# Patient Record
Sex: FEMALE | Race: BLACK OR AFRICAN AMERICAN | NOT HISPANIC OR LATINO | Employment: UNEMPLOYED | URBAN - METROPOLITAN AREA
[De-identification: names, ages, dates, MRNs, and addresses within clinical notes are randomized per-mention and may not be internally consistent; named-entity substitution may affect disease eponyms.]

---

## 2017-06-10 LAB
CHOLEST SERPL-MSCNC: 229 MG/DL (ref 100–200)
HDLC SERPL-MCNC: 75 MG/DL (ref 65–100)
LDL/HDL RATIO: 1.8 (ref 1–3)
LDLC SERPL CALC-MCNC: 132 MG/DL (ref 0–100)
TRIGL SERPL-MCNC: 110 MG/DL (ref 0–150)

## 2019-04-01 RX ORDER — LISINOPRIL 40 MG/1
TABLET ORAL
Qty: 90 TABLET | Refills: 3 | Status: SHIPPED | OUTPATIENT
Start: 2019-04-01 | End: 2019-06-06 | Stop reason: SDUPTHER

## 2019-06-06 ENCOUNTER — OFFICE VISIT (OUTPATIENT)
Dept: FAMILY MEDICINE | Facility: CLINIC | Age: 84
End: 2019-06-06
Payer: MEDICARE

## 2019-06-06 VITALS
SYSTOLIC BLOOD PRESSURE: 140 MMHG | HEIGHT: 64 IN | DIASTOLIC BLOOD PRESSURE: 80 MMHG | BODY MASS INDEX: 22.88 KG/M2 | RESPIRATION RATE: 16 BRPM | WEIGHT: 134 LBS | OXYGEN SATURATION: 98 % | TEMPERATURE: 98 F | HEART RATE: 74 BPM

## 2019-06-06 DIAGNOSIS — E78.5 HYPERLIPIDEMIA, UNSPECIFIED HYPERLIPIDEMIA TYPE: ICD-10-CM

## 2019-06-06 DIAGNOSIS — D50.8 IRON DEFICIENCY ANEMIA SECONDARY TO INADEQUATE DIETARY IRON INTAKE: ICD-10-CM

## 2019-06-06 DIAGNOSIS — K21.9 GASTROESOPHAGEAL REFLUX DISEASE WITHOUT ESOPHAGITIS: ICD-10-CM

## 2019-06-06 DIAGNOSIS — I10 ESSENTIAL HYPERTENSION: Primary | ICD-10-CM

## 2019-06-06 DIAGNOSIS — I73.9 PAD (PERIPHERAL ARTERY DISEASE): ICD-10-CM

## 2019-06-06 PROCEDURE — 99213 OFFICE O/P EST LOW 20 MIN: CPT | Mod: S$GLB,,, | Performed by: FAMILY MEDICINE

## 2019-06-06 PROCEDURE — 3077F PR MOST RECENT SYSTOLIC BLOOD PRESSURE >= 140 MM HG: ICD-10-PCS | Mod: CPTII,S$GLB,, | Performed by: FAMILY MEDICINE

## 2019-06-06 PROCEDURE — 3079F PR MOST RECENT DIASTOLIC BLOOD PRESSURE 80-89 MM HG: ICD-10-PCS | Mod: CPTII,S$GLB,, | Performed by: FAMILY MEDICINE

## 2019-06-06 PROCEDURE — 3079F DIAST BP 80-89 MM HG: CPT | Mod: CPTII,S$GLB,, | Performed by: FAMILY MEDICINE

## 2019-06-06 PROCEDURE — 3077F SYST BP >= 140 MM HG: CPT | Mod: CPTII,S$GLB,, | Performed by: FAMILY MEDICINE

## 2019-06-06 PROCEDURE — 99213 PR OFFICE/OUTPT VISIT, EST, LEVL III, 20-29 MIN: ICD-10-PCS | Mod: S$GLB,,, | Performed by: FAMILY MEDICINE

## 2019-06-06 RX ORDER — AMLODIPINE BESYLATE 5 MG/1
TABLET ORAL
COMMUNITY
End: 2019-06-06 | Stop reason: SDUPTHER

## 2019-06-06 RX ORDER — FERROUS SULFATE 325(65) MG
TABLET ORAL
Qty: 90 TABLET | Refills: 3 | Status: SHIPPED | OUTPATIENT
Start: 2019-06-06 | End: 2020-11-19 | Stop reason: SDUPTHER

## 2019-06-06 RX ORDER — GABAPENTIN 300 MG/1
CAPSULE ORAL
COMMUNITY

## 2019-06-06 RX ORDER — PRAVASTATIN SODIUM 20 MG/1
20 TABLET ORAL DAILY
Qty: 90 TABLET | Refills: 3 | Status: SHIPPED | OUTPATIENT
Start: 2019-06-06 | End: 2020-11-19 | Stop reason: SDUPTHER

## 2019-06-06 RX ORDER — LISINOPRIL 40 MG/1
40 TABLET ORAL DAILY
Qty: 90 TABLET | Refills: 3 | Status: SHIPPED | OUTPATIENT
Start: 2019-06-06 | End: 2020-06-18 | Stop reason: SDUPTHER

## 2019-06-06 RX ORDER — PRAVASTATIN SODIUM 20 MG/1
1 TABLET ORAL DAILY
COMMUNITY
End: 2019-06-06 | Stop reason: SDUPTHER

## 2019-06-06 RX ORDER — AMLODIPINE BESYLATE 5 MG/1
TABLET ORAL
Qty: 90 TABLET | Refills: 1 | Status: SHIPPED | OUTPATIENT
Start: 2019-06-06 | End: 2019-12-06 | Stop reason: SDUPTHER

## 2019-06-06 RX ORDER — FERROUS SULFATE 325(65) MG
TABLET ORAL
COMMUNITY
End: 2019-06-06 | Stop reason: SDUPTHER

## 2019-06-06 RX ORDER — CILOSTAZOL 50 MG/1
1 TABLET ORAL 2 TIMES DAILY
COMMUNITY
End: 2020-11-19 | Stop reason: SDUPTHER

## 2019-06-06 RX ORDER — ASPIRIN 81 MG/1
TABLET ORAL
COMMUNITY

## 2019-06-06 RX ORDER — ESOMEPRAZOLE MAGNESIUM 40 MG/1
40 CAPSULE, DELAYED RELEASE ORAL
Qty: 90 CAPSULE | Refills: 3 | Status: SHIPPED | OUTPATIENT
Start: 2019-06-06 | End: 2020-11-19 | Stop reason: SDUPTHER

## 2019-06-06 RX ORDER — ESOMEPRAZOLE MAGNESIUM 40 MG/1
40 CAPSULE, DELAYED RELEASE ORAL
COMMUNITY
End: 2019-06-06 | Stop reason: SDUPTHER

## 2019-06-06 RX ORDER — CLONIDINE HYDROCHLORIDE 0.1 MG/1
1 TABLET ORAL DAILY PRN
COMMUNITY
End: 2020-06-18 | Stop reason: SDUPTHER

## 2019-06-06 RX ORDER — CHOLECALCIFEROL (VITAMIN D3) 125 MCG
CAPSULE ORAL
COMMUNITY
End: 2020-11-19 | Stop reason: SDUPTHER

## 2019-06-06 NOTE — PROGRESS NOTES
Subjective:       Patient ID: Brenda Perez is a 84 y.o. female.    Chief Complaint: Follow-up    85 yo F here for f/u on  HTN, GERD, VALENTIN, HLD etc. Pt needs refills today.   Pt feels well, her BP is marginally controlled. Pt has not run out of any meds - she is current.   Pt has no other problems, questions, concerns or complaints.     Review of Systems   Constitutional: Negative for activity change, chills, fatigue, fever and unexpected weight change.   HENT: Negative for ear pain, rhinorrhea and trouble swallowing.    Eyes: Negative for pain.   Respiratory: Negative for cough, chest tightness, shortness of breath and wheezing.    Cardiovascular: Negative for chest pain and palpitations.   Gastrointestinal: Negative for abdominal distention, abdominal pain, constipation, diarrhea, nausea and vomiting.   Endocrine: Negative for cold intolerance and heat intolerance.   Genitourinary: Negative for dysuria, frequency and urgency.   Musculoskeletal: Negative for myalgias.   Skin: Negative for rash.   Neurological: Negative for dizziness, syncope, light-headedness and headaches.   Hematological: Does not bruise/bleed easily.   Psychiatric/Behavioral: Negative for agitation and confusion.       Objective:      Physical Exam   Constitutional: She appears well-developed.   HENT:   Right Ear: External ear normal.   Left Ear: External ear normal.   Mouth/Throat: Oropharynx is clear and moist.   Eyes: Conjunctivae and EOM are normal.   Neck: Normal range of motion.   Cardiovascular: Normal rate, regular rhythm and intact distal pulses.   Pulmonary/Chest: Effort normal and breath sounds normal.   Abdominal: Soft.   Skin: Skin is warm. Capillary refill takes less than 2 seconds.   Psychiatric: She has a normal mood and affect.       Assessment:       1. Essential hypertension    2. Hyperlipidemia, unspecified hyperlipidemia type    3. Iron deficiency anemia secondary to inadequate dietary iron intake    4. Gastroesophageal  reflux disease without esophagitis    5. PAD (peripheral artery disease)        Plan:       PROBLEM LIST     Brenda was seen today for follow-up.    Diagnoses and all orders for this visit:    Essential hypertension  -     lisinopril (PRINIVIL,ZESTRIL) 40 MG tablet; Take 1 tablet (40 mg total) by mouth once daily.  -     amLODIPine (NORVASC) 5 MG tablet; amlodipine 5 mg tablet  TK 1 T PO QD    Hyperlipidemia, unspecified hyperlipidemia type  -     pravastatin (PRAVACHOL) 20 MG tablet; Take 1 tablet (20 mg total) by mouth once daily.    Iron deficiency anemia secondary to inadequate dietary iron intake  -     ferrous sulfate (FEOSOL) 325 mg (65 mg iron) Tab tablet; ferrous sulfate 325 mg (65 mg iron) tablet  TK 1 T PO QD WITH MEALS    Gastroesophageal reflux disease without esophagitis  -     esomeprazole (NEXIUM) 40 MG capsule; Take 1 capsule (40 mg total) by mouth before breakfast.    PAD (peripheral artery disease)

## 2019-08-19 ENCOUNTER — OFFICE VISIT (OUTPATIENT)
Dept: FAMILY MEDICINE | Facility: CLINIC | Age: 84
End: 2019-08-19
Payer: MEDICARE

## 2019-08-19 VITALS
TEMPERATURE: 97 F | DIASTOLIC BLOOD PRESSURE: 64 MMHG | HEART RATE: 62 BPM | HEIGHT: 64 IN | WEIGHT: 133 LBS | BODY MASS INDEX: 22.71 KG/M2 | OXYGEN SATURATION: 96 % | SYSTOLIC BLOOD PRESSURE: 140 MMHG | RESPIRATION RATE: 16 BRPM

## 2019-08-19 DIAGNOSIS — S43.005D DISLOCATION OF LEFT SHOULDER JOINT, SUBSEQUENT ENCOUNTER: ICD-10-CM

## 2019-08-19 DIAGNOSIS — Z09 HOSPITAL DISCHARGE FOLLOW-UP: Primary | ICD-10-CM

## 2019-08-19 PROBLEM — S43.005A DISLOCATION OF LEFT SHOULDER JOINT: Status: ACTIVE | Noted: 2019-08-19

## 2019-08-19 PROCEDURE — 99213 OFFICE O/P EST LOW 20 MIN: CPT | Mod: S$GLB,,, | Performed by: FAMILY MEDICINE

## 2019-08-19 PROCEDURE — 3077F PR MOST RECENT SYSTOLIC BLOOD PRESSURE >= 140 MM HG: ICD-10-PCS | Mod: CPTII,S$GLB,, | Performed by: FAMILY MEDICINE

## 2019-08-19 PROCEDURE — 3078F DIAST BP <80 MM HG: CPT | Mod: CPTII,S$GLB,, | Performed by: FAMILY MEDICINE

## 2019-08-19 PROCEDURE — 3078F PR MOST RECENT DIASTOLIC BLOOD PRESSURE < 80 MM HG: ICD-10-PCS | Mod: CPTII,S$GLB,, | Performed by: FAMILY MEDICINE

## 2019-08-19 PROCEDURE — 3077F SYST BP >= 140 MM HG: CPT | Mod: CPTII,S$GLB,, | Performed by: FAMILY MEDICINE

## 2019-08-19 PROCEDURE — 99213 PR OFFICE/OUTPT VISIT, EST, LEVL III, 20-29 MIN: ICD-10-PCS | Mod: S$GLB,,, | Performed by: FAMILY MEDICINE

## 2019-08-19 NOTE — PROGRESS NOTES
Subjective:       Patient ID: Brenda Perez is a 84 y.o. female.    Chief Complaint: Follow-up (From ER for dislocared shoulder)    85 yo F here for f/u on ER visit 9 days ago. Pt stumbled over a stump of some sort (going to a wedding) and she fainted after stumbling as she dislocated her let shoulder. It was relocated by ER physician at [facility]. Pt was also worked up for injuries (including CT of head) and everything was negative.   Pt also scraped  Her right knee was also scraped but it is healing well.   Pt wants me to look at her shoulder today.     Review of Systems   Constitutional: Negative for activity change, chills, fatigue, fever and unexpected weight change.   HENT: Negative for ear pain, rhinorrhea and trouble swallowing.    Eyes: Negative for pain.   Respiratory: Negative for cough, chest tightness, shortness of breath and wheezing.    Cardiovascular: Negative for chest pain and palpitations.   Gastrointestinal: Negative for abdominal distention, abdominal pain, constipation, diarrhea, nausea and vomiting.   Endocrine: Negative for cold intolerance and heat intolerance.   Genitourinary: Negative for dysuria, frequency and urgency.   Musculoskeletal: Negative for myalgias.   Skin: Negative for rash.   Neurological: Negative for dizziness, syncope, light-headedness and headaches.   Hematological: Does not bruise/bleed easily.   Psychiatric/Behavioral: Negative for agitation and confusion.       Objective:      Physical Exam   Constitutional: She appears well-developed.   HENT:   Right Ear: External ear normal.   Left Ear: External ear normal.   Mouth/Throat: Oropharynx is clear and moist.   Eyes: Conjunctivae and EOM are normal.   Neck: Normal range of motion.   Cardiovascular: Normal rate, regular rhythm and intact distal pulses.   Pulmonary/Chest: Effort normal and breath sounds normal.   Abdominal: Soft.   Neurological:   Strength 4/5 on left upper extremity except for  strength. Some anterior  shoulder tenderness   Skin: Skin is warm. Capillary refill takes less than 2 seconds.   Psychiatric: She has a normal mood and affect.       Assessment:       1. Hospital discharge follow-up    2. Dislocation of left shoulder joint, subsequent encounter        Plan:       PROBLEM LIST     Brenda was seen today for follow-up.    Diagnoses and all orders for this visit:    Hospital discharge follow-up    Dislocation of left shoulder joint, subsequent encounter  Comments:  ER visit, relocated in ER

## 2019-08-19 NOTE — PATIENT INSTRUCTIONS
Alternate wet warm rag vs ice bag on the shoulder - give it rest for now - for 2 weeks then start stretching and exercising

## 2019-09-19 ENCOUNTER — OFFICE VISIT (OUTPATIENT)
Dept: FAMILY MEDICINE | Facility: CLINIC | Age: 84
End: 2019-09-19
Payer: MEDICARE

## 2019-09-19 VITALS
BODY MASS INDEX: 22.53 KG/M2 | TEMPERATURE: 97 F | OXYGEN SATURATION: 96 % | DIASTOLIC BLOOD PRESSURE: 100 MMHG | WEIGHT: 132 LBS | SYSTOLIC BLOOD PRESSURE: 166 MMHG | HEART RATE: 77 BPM | HEIGHT: 64 IN

## 2019-09-19 DIAGNOSIS — S43.005D DISLOCATION OF LEFT SHOULDER JOINT, SUBSEQUENT ENCOUNTER: Primary | ICD-10-CM

## 2019-09-19 PROCEDURE — 99213 OFFICE O/P EST LOW 20 MIN: CPT | Mod: S$GLB,,, | Performed by: FAMILY MEDICINE

## 2019-09-19 PROCEDURE — 3080F PR MOST RECENT DIASTOLIC BLOOD PRESSURE >= 90 MM HG: ICD-10-PCS | Mod: CPTII,S$GLB,, | Performed by: FAMILY MEDICINE

## 2019-09-19 PROCEDURE — 1101F PR PT FALLS ASSESS DOC 0-1 FALLS W/OUT INJ PAST YR: ICD-10-PCS | Mod: CPTII,S$GLB,, | Performed by: FAMILY MEDICINE

## 2019-09-19 PROCEDURE — 99213 PR OFFICE/OUTPT VISIT, EST, LEVL III, 20-29 MIN: ICD-10-PCS | Mod: S$GLB,,, | Performed by: FAMILY MEDICINE

## 2019-09-19 PROCEDURE — 1101F PT FALLS ASSESS-DOCD LE1/YR: CPT | Mod: CPTII,S$GLB,, | Performed by: FAMILY MEDICINE

## 2019-09-19 PROCEDURE — 3077F PR MOST RECENT SYSTOLIC BLOOD PRESSURE >= 140 MM HG: ICD-10-PCS | Mod: CPTII,S$GLB,, | Performed by: FAMILY MEDICINE

## 2019-09-19 PROCEDURE — 3077F SYST BP >= 140 MM HG: CPT | Mod: CPTII,S$GLB,, | Performed by: FAMILY MEDICINE

## 2019-09-19 PROCEDURE — 3080F DIAST BP >= 90 MM HG: CPT | Mod: CPTII,S$GLB,, | Performed by: FAMILY MEDICINE

## 2019-09-19 NOTE — PROGRESS NOTES
Subjective:       Patient ID: Brenda Perez is a 84 y.o. female.    Chief Complaint: Shoulder Pain (Pt stated that she hurt her left shoulder last month and still is having pain in her shoulder down to her left wrist. Pt was wondering if she needed physical therapy.)    83 yo F here for f/u on dislocated left shoulder x 6 weeks ago. Pt still has pain in her shoulder and she requests PT if that would be helpful.   Pt tried to cook yesterday and she could not supine her wrist. She also has pain in wrist and elbow. She also still has a hard time raising up her arm in abduction.  No other problems or concerns at this time.     Review of Systems   Constitutional: Negative for activity change, chills, fatigue, fever and unexpected weight change.   HENT: Negative for ear pain, rhinorrhea and trouble swallowing.    Eyes: Negative for pain.   Respiratory: Negative for cough, chest tightness, shortness of breath and wheezing.    Cardiovascular: Negative for chest pain and palpitations.   Gastrointestinal: Negative for abdominal distention, abdominal pain, constipation, diarrhea, nausea and vomiting.   Endocrine: Negative for cold intolerance and heat intolerance.   Genitourinary: Negative for dysuria, frequency and urgency.   Musculoskeletal: Positive for arthralgias and myalgias.   Skin: Negative for rash.   Neurological: Negative for dizziness, syncope, light-headedness and headaches.   Hematological: Does not bruise/bleed easily.   Psychiatric/Behavioral: Negative for agitation and confusion.       Objective:      Physical Exam   Constitutional: She appears well-developed.   HENT:   Right Ear: External ear normal.   Left Ear: External ear normal.   Mouth/Throat: Oropharynx is clear and moist.   Eyes: Conjunctivae and EOM are normal.   Neck: Normal range of motion.   Cardiovascular: Normal rate, regular rhythm and intact distal pulses.   Pulmonary/Chest: Effort normal and breath sounds normal.   Abdominal: Soft.   Skin:  Skin is warm. Capillary refill takes less than 2 seconds.   Psychiatric: She has a normal mood and affect.   Nursing note and vitals reviewed.      Assessment:       1. Dislocation of left shoulder joint, subsequent encounter        Plan:       PROBLEM LIST     Brenda was seen today for shoulder pain.    Diagnoses and all orders for this visit:    Dislocation of left shoulder joint, subsequent encounter  -     Ambulatory Referral to Physical/Occupational Therapy

## 2019-12-06 ENCOUNTER — OFFICE VISIT (OUTPATIENT)
Dept: FAMILY MEDICINE | Facility: CLINIC | Age: 84
End: 2019-12-06
Payer: MEDICARE

## 2019-12-06 VITALS
SYSTOLIC BLOOD PRESSURE: 142 MMHG | OXYGEN SATURATION: 98 % | DIASTOLIC BLOOD PRESSURE: 77 MMHG | TEMPERATURE: 97 F | BODY MASS INDEX: 22.36 KG/M2 | HEIGHT: 64 IN | WEIGHT: 131 LBS | HEART RATE: 60 BPM | RESPIRATION RATE: 16 BRPM

## 2019-12-06 DIAGNOSIS — S43.005D DISLOCATION OF LEFT SHOULDER JOINT, SUBSEQUENT ENCOUNTER: Primary | ICD-10-CM

## 2019-12-06 DIAGNOSIS — I10 ESSENTIAL HYPERTENSION: ICD-10-CM

## 2019-12-06 PROCEDURE — 1159F PR MEDICATION LIST DOCUMENTED IN MEDICAL RECORD: ICD-10-PCS | Mod: S$GLB,,, | Performed by: FAMILY MEDICINE

## 2019-12-06 PROCEDURE — 1101F PR PT FALLS ASSESS DOC 0-1 FALLS W/OUT INJ PAST YR: ICD-10-PCS | Mod: CPTII,S$GLB,, | Performed by: FAMILY MEDICINE

## 2019-12-06 PROCEDURE — 1101F PT FALLS ASSESS-DOCD LE1/YR: CPT | Mod: CPTII,S$GLB,, | Performed by: FAMILY MEDICINE

## 2019-12-06 PROCEDURE — 99213 OFFICE O/P EST LOW 20 MIN: CPT | Mod: S$GLB,,, | Performed by: FAMILY MEDICINE

## 2019-12-06 PROCEDURE — 3077F PR MOST RECENT SYSTOLIC BLOOD PRESSURE >= 140 MM HG: ICD-10-PCS | Mod: CPTII,S$GLB,, | Performed by: FAMILY MEDICINE

## 2019-12-06 PROCEDURE — 3078F DIAST BP <80 MM HG: CPT | Mod: CPTII,S$GLB,, | Performed by: FAMILY MEDICINE

## 2019-12-06 PROCEDURE — 3077F SYST BP >= 140 MM HG: CPT | Mod: CPTII,S$GLB,, | Performed by: FAMILY MEDICINE

## 2019-12-06 PROCEDURE — 3078F PR MOST RECENT DIASTOLIC BLOOD PRESSURE < 80 MM HG: ICD-10-PCS | Mod: CPTII,S$GLB,, | Performed by: FAMILY MEDICINE

## 2019-12-06 PROCEDURE — 99213 PR OFFICE/OUTPT VISIT, EST, LEVL III, 20-29 MIN: ICD-10-PCS | Mod: S$GLB,,, | Performed by: FAMILY MEDICINE

## 2019-12-06 PROCEDURE — 1159F MED LIST DOCD IN RCRD: CPT | Mod: S$GLB,,, | Performed by: FAMILY MEDICINE

## 2019-12-06 RX ORDER — ORPHENADRINE CITRATE 100 MG/1
100 TABLET, EXTENDED RELEASE ORAL 2 TIMES DAILY
Qty: 20 TABLET | Refills: 0 | Status: SHIPPED | OUTPATIENT
Start: 2019-12-06 | End: 2019-12-16

## 2019-12-06 RX ORDER — AMLODIPINE BESYLATE 5 MG/1
TABLET ORAL
Qty: 90 TABLET | Refills: 1 | Status: SHIPPED | OUTPATIENT
Start: 2019-12-06 | End: 2020-06-18 | Stop reason: SDUPTHER

## 2019-12-06 NOTE — PROGRESS NOTES
Subjective:       Patient ID: Brenda Perez is a 84 y.o. female.    Chief Complaint: Follow-up (for dislocation of left shoulder) and Medication Refill    85 yo F here for her dislocated left shoulder. Pt is left handed. Pt had PT done which helped only a little. She was told that she could pay out of pocket for more treatments and time will heal this. Pt is doing the same exercises on her own now. She still cannot lift her arm over her head.   [place] the mailbox she fell over was on private property though.     Review of Systems   Constitutional: Negative for activity change, chills, fatigue, fever and unexpected weight change.   HENT: Negative for ear pain, rhinorrhea and trouble swallowing.    Eyes: Negative for pain.   Respiratory: Negative for cough, chest tightness, shortness of breath and wheezing.    Cardiovascular: Negative for chest pain and palpitations.   Gastrointestinal: Negative for abdominal distention, abdominal pain, constipation, diarrhea, nausea and vomiting.   Endocrine: Negative for cold intolerance and heat intolerance.   Genitourinary: Negative for dysuria, frequency and urgency.   Musculoskeletal: Negative for myalgias.   Skin: Negative for rash.   Neurological: Negative for dizziness, syncope, light-headedness and headaches.   Hematological: Does not bruise/bleed easily.   Psychiatric/Behavioral: Negative for agitation and confusion.       Objective:      Physical Exam   Constitutional: She appears well-developed.   HENT:   Right Ear: External ear normal.   Left Ear: External ear normal.   Mouth/Throat: Oropharynx is clear and moist.   Eyes: Conjunctivae and EOM are normal.   Neck: Normal range of motion.   Cardiovascular: Normal rate, regular rhythm and intact distal pulses.   Pulmonary/Chest: Effort normal and breath sounds normal.   Abdominal: Soft.   Musculoskeletal: She exhibits tenderness. She exhibits no edema.   Neurological: She is alert.   Skin:  Skin is warm. Capillary refill takes less than 2 seconds.   Psychiatric: She has a normal mood and affect.       Assessment:       1. Dislocation of left shoulder joint, subsequent encounter 8/19    2. Essential hypertension        Plan:       PROBLEM LIST     Brenda was seen today for follow-up and medication refill.    Diagnoses and all orders for this visit:    Dislocation of left shoulder joint, subsequent encounter 8/19  Comments:  PT helped a little; pt is left handed, orphenadrine and exercises  Orders:  -     orphenadrine (NORFLEX) 100 mg tablet; Take 1 tablet (100 mg total) by mouth 2 (two) times daily. for 10 days    Essential hypertension  Comments:  amlodipine 5 mg refilled   Orders:  -     amLODIPine (NORVASC) 5 MG tablet; amlodipine 5 mg tablet  TK 1 T PO QD

## 2020-01-20 RX ORDER — CHOLECALCIFEROL (VITAMIN D3) 125 MCG
CAPSULE ORAL
Qty: 90 CAPSULE | OUTPATIENT
Start: 2020-01-20

## 2020-02-04 NOTE — TELEPHONE ENCOUNTER
I called to ask pt to make an appointment for her vitamin d refills and lab work but the person that answered the phone said she wasn't there and hung up.

## 2020-04-15 DIAGNOSIS — S43.005D DISLOCATION OF LEFT SHOULDER JOINT, SUBSEQUENT ENCOUNTER: ICD-10-CM

## 2020-04-15 RX ORDER — CHOLECALCIFEROL (VITAMIN D3) 125 MCG
CAPSULE ORAL
Qty: 90 CAPSULE | OUTPATIENT
Start: 2020-04-15

## 2020-04-15 RX ORDER — ORPHENADRINE CITRATE 100 MG/1
TABLET, EXTENDED RELEASE ORAL
Qty: 20 TABLET | Refills: 0 | OUTPATIENT
Start: 2020-04-15

## 2020-05-14 ENCOUNTER — PATIENT OUTREACH (OUTPATIENT)
Dept: ADMINISTRATIVE | Facility: HOSPITAL | Age: 85
End: 2020-05-14

## 2020-05-14 NOTE — PROGRESS NOTES
No immunizations added.  updated. Care Everywhere abstracted. Enter/edited lipid from care everywhere.  Media: no new records found Legacy: no new records found.  Sent fax for dexa scan.  *KDL*

## 2020-05-14 NOTE — LETTER
May 14, 2020      We are seeing Brenda Perez, 1935, at [facility]. Reta Dave MD is their primary care physician. To help with our [place] maintenance records could you please send the following:     DEXA SCAN    Please fax to [facility] at [phone], attention Jayla Her.     Thank-you in advance for your assistance. If you have any questions or concerns please contact me at [phone].     Jayla MUELLER LPN  Care Coordination Department  [facility]  [phone]

## 2020-06-02 ENCOUNTER — TELEPHONE (OUTPATIENT)
Dept: FAMILY MEDICINE | Facility: CLINIC | Age: 85
End: 2020-06-02

## 2020-06-02 NOTE — TELEPHONE ENCOUNTER
I called and left a message asking her if she has sent a medical records release. And asked her to call us back.

## 2020-06-02 NOTE — TELEPHONE ENCOUNTER
----- Message from Mark Borges sent at 6/1/2020  4:33 PM CDT -----  Contact: Lola with [facility]   Please call Lola to discuss medical records request for this patient [phone].

## 2020-06-03 ENCOUNTER — TELEPHONE (OUTPATIENT)
Dept: FAMILY MEDICINE | Facility: CLINIC | Age: 85
End: 2020-06-03

## 2020-06-03 NOTE — TELEPHONE ENCOUNTER
Called pt. They said they did not call. Not sure why the call center said pt. did, and there was not specifics about the call from the call center.

## 2020-06-12 ENCOUNTER — TELEPHONE (OUTPATIENT)
Dept: FAMILY MEDICINE | Facility: CLINIC | Age: 85
End: 2020-06-12

## 2020-06-12 NOTE — TELEPHONE ENCOUNTER
----- Message from Mark Borges sent at 6/9/2020 10:16 AM CDT -----  Regarding: Lola with [facility]'s office  Contact: [phone]  Type:  Patient Returning Call    Who Called:Lola  Who Left Message for Patient:Yee  Does the patient know what this is regarding?:na  Would the patient rather a call back or a response via [facility]? Call back  Best Call Back Number:[phone]  Additional Information: na

## 2020-06-12 NOTE — TELEPHONE ENCOUNTER
I called Lola and let her know that I did not get a medical record release and that there was some one that they have to send a e-mail to to get these records.

## 2020-06-18 ENCOUNTER — OFFICE VISIT (OUTPATIENT)
Dept: FAMILY MEDICINE | Facility: CLINIC | Age: 85
End: 2020-06-18
Payer: MEDICARE

## 2020-06-18 VITALS
DIASTOLIC BLOOD PRESSURE: 90 MMHG | HEART RATE: 76 BPM | OXYGEN SATURATION: 99 % | BODY MASS INDEX: 22.6 KG/M2 | TEMPERATURE: 97 F | RESPIRATION RATE: 16 BRPM | SYSTOLIC BLOOD PRESSURE: 180 MMHG | HEIGHT: 64 IN | WEIGHT: 132.38 LBS

## 2020-06-18 DIAGNOSIS — E11.9 TYPE 2 DIABETES MELLITUS WITHOUT COMPLICATION, WITHOUT LONG-TERM CURRENT USE OF INSULIN: ICD-10-CM

## 2020-06-18 DIAGNOSIS — S43.005D DISLOCATION OF LEFT SHOULDER JOINT, SUBSEQUENT ENCOUNTER: Chronic | ICD-10-CM

## 2020-06-18 DIAGNOSIS — E03.9 HYPOTHYROIDISM, UNSPECIFIED TYPE: ICD-10-CM

## 2020-06-18 DIAGNOSIS — I10 ESSENTIAL HYPERTENSION: Primary | Chronic | ICD-10-CM

## 2020-06-18 DIAGNOSIS — E78.5 HYPERLIPIDEMIA, UNSPECIFIED HYPERLIPIDEMIA TYPE: Chronic | ICD-10-CM

## 2020-06-18 DIAGNOSIS — E55.9 VITAMIN D DEFICIENCY: ICD-10-CM

## 2020-06-18 DIAGNOSIS — E53.8 B12 DEFICIENCY: ICD-10-CM

## 2020-06-18 DIAGNOSIS — I73.9 PAD (PERIPHERAL ARTERY DISEASE): Chronic | ICD-10-CM

## 2020-06-18 PROCEDURE — 99214 OFFICE O/P EST MOD 30 MIN: CPT | Mod: S$GLB,,, | Performed by: FAMILY MEDICINE

## 2020-06-18 PROCEDURE — 3080F DIAST BP >= 90 MM HG: CPT | Mod: CPTII,S$GLB,, | Performed by: FAMILY MEDICINE

## 2020-06-18 PROCEDURE — 1159F MED LIST DOCD IN RCRD: CPT | Mod: S$GLB,,, | Performed by: FAMILY MEDICINE

## 2020-06-18 PROCEDURE — 1101F PT FALLS ASSESS-DOCD LE1/YR: CPT | Mod: CPTII,S$GLB,, | Performed by: FAMILY MEDICINE

## 2020-06-18 PROCEDURE — 3080F PR MOST RECENT DIASTOLIC BLOOD PRESSURE >= 90 MM HG: ICD-10-PCS | Mod: CPTII,S$GLB,, | Performed by: FAMILY MEDICINE

## 2020-06-18 PROCEDURE — 1159F PR MEDICATION LIST DOCUMENTED IN MEDICAL RECORD: ICD-10-PCS | Mod: S$GLB,,, | Performed by: FAMILY MEDICINE

## 2020-06-18 PROCEDURE — 3077F SYST BP >= 140 MM HG: CPT | Mod: CPTII,S$GLB,, | Performed by: FAMILY MEDICINE

## 2020-06-18 PROCEDURE — 3077F PR MOST RECENT SYSTOLIC BLOOD PRESSURE >= 140 MM HG: ICD-10-PCS | Mod: CPTII,S$GLB,, | Performed by: FAMILY MEDICINE

## 2020-06-18 PROCEDURE — 1101F PR PT FALLS ASSESS DOC 0-1 FALLS W/OUT INJ PAST YR: ICD-10-PCS | Mod: CPTII,S$GLB,, | Performed by: FAMILY MEDICINE

## 2020-06-18 PROCEDURE — 99214 PR OFFICE/OUTPT VISIT, EST, LEVL IV, 30-39 MIN: ICD-10-PCS | Mod: S$GLB,,, | Performed by: FAMILY MEDICINE

## 2020-06-18 RX ORDER — LISINOPRIL 40 MG/1
40 TABLET ORAL DAILY
Qty: 90 TABLET | Refills: 3 | Status: SHIPPED | OUTPATIENT
Start: 2020-06-18 | End: 2020-10-08

## 2020-06-18 RX ORDER — AMLODIPINE BESYLATE 5 MG/1
TABLET ORAL
Qty: 90 TABLET | Refills: 1 | Status: SHIPPED | OUTPATIENT
Start: 2020-06-18 | End: 2021-01-13 | Stop reason: SDUPTHER

## 2020-06-18 RX ORDER — CLONIDINE HYDROCHLORIDE 0.1 MG/1
0.1 TABLET ORAL DAILY PRN
Qty: 30 TABLET | Refills: 5 | Status: SHIPPED | OUTPATIENT
Start: 2020-06-18 | End: 2021-06-15 | Stop reason: SDUPTHER

## 2020-06-18 RX ORDER — ORPHENADRINE CITRATE 100 MG/1
100 TABLET, EXTENDED RELEASE ORAL 2 TIMES DAILY
Qty: 20 TABLET | Refills: 0 | Status: SHIPPED | OUTPATIENT
Start: 2020-06-18 | End: 2020-06-28

## 2020-06-18 NOTE — PROGRESS NOTES
Subjective:       Patient ID: Brenda Perez is a 85 y.o. female.    Chief Complaint: Follow-up (pt is her for a check up and medication refills. )    84 yo F here for f/u on her chronic dz and to let me know she had another stent put in her leg by Dr Webster this month. She feels well, her legs - don't hurt too much. She had pain in the back of the legs.   HTN: is uncontrolled - this since her son had a heart attack one week ago. The son is still in the hospital - not really responsive - in ICU.   Hx of dislocation of left shoulder. Pt feels somewhat better but she still has pain. She would like her orphenadrine refilled.   HLD: we'll do labs today. Pt is compliant with meds, she takes them in the evening. No AEs.     Review of Systems   Constitutional: Negative for activity change, chills, fatigue, fever and unexpected weight change.   HENT: Negative for ear pain, rhinorrhea and trouble swallowing.    Eyes: Negative for pain.   Respiratory: Negative for cough, chest tightness, shortness of breath and wheezing.    Cardiovascular: Negative for chest pain and palpitations.   Gastrointestinal: Negative for abdominal distention, abdominal pain, constipation, diarrhea, nausea and vomiting.   Endocrine: Negative for cold intolerance and heat intolerance.   Genitourinary: Negative for dysuria, frequency and urgency.   Musculoskeletal: Negative for myalgias.   Skin: Negative for rash.   Neurological: Negative for dizziness, syncope, light-headedness and headaches.   Hematological: Does not bruise/bleed easily.   Psychiatric/Behavioral: Negative for agitation and confusion.       Objective:      Physical Exam  Vitals signs and nursing note reviewed.   Constitutional:       Appearance: She is well-developed.   HENT:      Head: Normocephalic.      Right Ear: External ear normal.      Left Ear: External ear normal.      Nose: Nose normal.   Eyes:      Extraocular Movements: Extraocular movements intact.       Conjunctiva/sclera: Conjunctivae normal.   Neck:      Musculoskeletal: Normal range of motion.   Cardiovascular:      Rate and Rhythm: Normal rate and regular rhythm.      Pulses: Normal pulses.   Pulmonary:      Effort: Pulmonary effort is normal.      Breath sounds: Normal breath sounds.   Abdominal:      Palpations: Abdomen is soft.   Musculoskeletal: Normal range of motion.         General: No deformity.   Skin:     General: Skin is warm.      Capillary Refill: Capillary refill takes less than 2 seconds.   Neurological:      General: No focal deficit present.      Mental Status: She is alert.   Psychiatric:         Mood and Affect: Mood normal.         Assessment:       1. Essential hypertension Poorly controlled   2. PAD (peripheral artery disease) - Dr Webster    3. Dislocation of left shoulder joint, subsequent encounter    4. Type 2 diabetes mellitus without complication, without long-term current use of insulin    5. Vitamin D deficiency    6. Hypothyroidism, unspecified type    7. B12 deficiency    8. Hyperlipidemia, unspecified hyperlipidemia type        Plan:       PROBLEM LIST     Brenda was seen today for follow-up.    Diagnoses and all orders for this visit:    Essential hypertension  Comments:  monitor and take clonidine when indicated  Orders:  -     amLODIPine (NORVASC) 5 MG tablet; amlodipine 5 mg tablet  TK 1 T PO QD  -     cloNIDine (CATAPRES) 0.1 MG tablet; Take 1 tablet (0.1 mg total) by mouth daily as needed.  -     lisinopriL (PRINIVIL,ZESTRIL) 40 MG tablet; Take 1 tablet (40 mg total) by mouth once daily.  -     CBC auto differential; Future  -     Comprehensive metabolic panel; Future  -     CBC auto differential  -     Comprehensive metabolic panel    PAD (peripheral artery disease) - Dr Webster  Comments:  2 stents now - on in each leg  Orders:  -     Lipid Panel; Future  -     Lipid Panel    Dislocation of left shoulder joint, subsequent encounter  Comments:  still painfull and pt  would like some orphenadrine  Orders:  -     orphenadrine (NORFLEX) 100 mg tablet; Take 1 tablet (100 mg total) by mouth 2 (two) times daily. for 10 days    Type 2 diabetes mellitus without complication, without long-term current use of insulin  -     Hemoglobin A1C; Future  -     Hemoglobin A1C    Vitamin D deficiency  -     Vitamin D; Future  -     Vitamin D    Hypothyroidism, unspecified type  -     TSH; Future  -     TSH    B12 deficiency  -     Vitamin B12; Future  -     Vitamin B12    Hyperlipidemia, unspecified hyperlipidemia type  Comments:  labs today for re-evaluation  Orders:  -     Lipid Panel; Future  -     Lipid Panel

## 2020-06-19 ENCOUNTER — TELEPHONE (OUTPATIENT)
Dept: FAMILY MEDICINE | Facility: CLINIC | Age: 85
End: 2020-06-19

## 2020-06-19 NOTE — TELEPHONE ENCOUNTER
----- Message from Joann Cevallos sent at 6/19/2020  2:57 PM CDT -----  Tenzin lyman with [employer] returned call, faxed info...[phone] or [phone]

## 2020-11-17 ENCOUNTER — TELEPHONE (OUTPATIENT)
Dept: FAMILY MEDICINE | Facility: CLINIC | Age: 85
End: 2020-11-17

## 2020-11-17 NOTE — TELEPHONE ENCOUNTER
----- Message from Joann Cevallos sent at 11/16/2020 11:57 AM CST -----  gavi ho with [facility] management states that living aid paperwork was sent 11/11, please fill out and return asap to her at [phone]//phn: [phone]

## 2020-11-17 NOTE — TELEPHONE ENCOUNTER
----- Message from Joann Cevallos sent at 11/16/2020 11:57 AM CST -----  gavi ho with [facility] management states that living aid paperwork was sent 11/11, please fill out and return asap to her at [phone]//phn: [phone]

## 2020-11-19 ENCOUNTER — OFFICE VISIT (OUTPATIENT)
Dept: PRIMARY CARE CLINIC | Facility: CLINIC | Age: 85
End: 2020-11-19
Payer: MEDICARE

## 2020-11-19 VITALS
BODY MASS INDEX: 23.22 KG/M2 | DIASTOLIC BLOOD PRESSURE: 98 MMHG | HEIGHT: 64 IN | TEMPERATURE: 98 F | RESPIRATION RATE: 18 BRPM | SYSTOLIC BLOOD PRESSURE: 160 MMHG | HEART RATE: 68 BPM | WEIGHT: 136 LBS | OXYGEN SATURATION: 98 %

## 2020-11-19 DIAGNOSIS — K21.9 GASTROESOPHAGEAL REFLUX DISEASE WITHOUT ESOPHAGITIS: ICD-10-CM

## 2020-11-19 DIAGNOSIS — I73.9 PAD (PERIPHERAL ARTERY DISEASE): ICD-10-CM

## 2020-11-19 DIAGNOSIS — E78.5 HYPERLIPIDEMIA, UNSPECIFIED HYPERLIPIDEMIA TYPE: Primary | ICD-10-CM

## 2020-11-19 DIAGNOSIS — E78.5 HYPERLIPIDEMIA, UNSPECIFIED HYPERLIPIDEMIA TYPE: ICD-10-CM

## 2020-11-19 DIAGNOSIS — I10 ESSENTIAL HYPERTENSION: ICD-10-CM

## 2020-11-19 DIAGNOSIS — D50.8 IRON DEFICIENCY ANEMIA SECONDARY TO INADEQUATE DIETARY IRON INTAKE: ICD-10-CM

## 2020-11-19 PROCEDURE — 3077F PR MOST RECENT SYSTOLIC BLOOD PRESSURE >= 140 MM HG: ICD-10-PCS | Mod: CPTII,S$GLB,, | Performed by: INTERNAL MEDICINE

## 2020-11-19 PROCEDURE — 3080F DIAST BP >= 90 MM HG: CPT | Mod: CPTII,S$GLB,, | Performed by: INTERNAL MEDICINE

## 2020-11-19 PROCEDURE — 99214 OFFICE O/P EST MOD 30 MIN: CPT | Mod: S$GLB,,, | Performed by: INTERNAL MEDICINE

## 2020-11-19 PROCEDURE — 3077F SYST BP >= 140 MM HG: CPT | Mod: CPTII,S$GLB,, | Performed by: INTERNAL MEDICINE

## 2020-11-19 PROCEDURE — 1159F MED LIST DOCD IN RCRD: CPT | Mod: S$GLB,,, | Performed by: INTERNAL MEDICINE

## 2020-11-19 PROCEDURE — 1159F PR MEDICATION LIST DOCUMENTED IN MEDICAL RECORD: ICD-10-PCS | Mod: S$GLB,,, | Performed by: INTERNAL MEDICINE

## 2020-11-19 PROCEDURE — 99214 PR OFFICE/OUTPT VISIT, EST, LEVL IV, 30-39 MIN: ICD-10-PCS | Mod: S$GLB,,, | Performed by: INTERNAL MEDICINE

## 2020-11-19 PROCEDURE — 3080F PR MOST RECENT DIASTOLIC BLOOD PRESSURE >= 90 MM HG: ICD-10-PCS | Mod: CPTII,S$GLB,, | Performed by: INTERNAL MEDICINE

## 2020-11-19 RX ORDER — ORPHENADRINE CITRATE 100 MG/1
100 TABLET, EXTENDED RELEASE ORAL 2 TIMES DAILY
COMMUNITY
End: 2020-11-19 | Stop reason: SDUPTHER

## 2020-11-19 RX ORDER — ORPHENADRINE CITRATE 100 MG/1
100 TABLET, EXTENDED RELEASE ORAL 2 TIMES DAILY
Qty: 90 TABLET | Refills: 2 | Status: SHIPPED | OUTPATIENT
Start: 2020-11-19 | End: 2022-06-16 | Stop reason: ALTCHOICE

## 2020-11-19 RX ORDER — CLOPIDOGREL BISULFATE 75 MG/1
75 TABLET ORAL DAILY
COMMUNITY
End: 2020-11-19 | Stop reason: SDUPTHER

## 2020-11-19 RX ORDER — CILOSTAZOL 50 MG/1
50 TABLET ORAL 2 TIMES DAILY
Qty: 90 TABLET | Refills: 2 | Status: SHIPPED | OUTPATIENT
Start: 2020-11-19 | End: 2021-01-04 | Stop reason: SDUPTHER

## 2020-11-19 RX ORDER — ESOMEPRAZOLE MAGNESIUM 40 MG/1
40 CAPSULE, DELAYED RELEASE ORAL
Qty: 90 CAPSULE | Refills: 3 | Status: SHIPPED | OUTPATIENT
Start: 2020-11-19 | End: 2021-06-15 | Stop reason: SDUPTHER

## 2020-11-19 RX ORDER — FERROUS SULFATE 325(65) MG
TABLET ORAL
Qty: 90 TABLET | Refills: 3 | Status: SHIPPED | OUTPATIENT
Start: 2020-11-19 | End: 2021-01-04 | Stop reason: SDUPTHER

## 2020-11-19 RX ORDER — PRAVASTATIN SODIUM 20 MG/1
20 TABLET ORAL DAILY
Qty: 90 TABLET | Refills: 3 | Status: SHIPPED | OUTPATIENT
Start: 2020-11-19 | End: 2021-01-04 | Stop reason: SDUPTHER

## 2020-11-19 RX ORDER — CLOPIDOGREL BISULFATE 75 MG/1
75 TABLET ORAL DAILY
Qty: 90 TABLET | Refills: 2 | Status: SHIPPED | OUTPATIENT
Start: 2020-11-19 | End: 2021-01-04 | Stop reason: SDUPTHER

## 2020-11-19 RX ORDER — CHOLECALCIFEROL (VITAMIN D3) 125 MCG
CAPSULE ORAL
Qty: 90 CAPSULE | Refills: 2 | Status: SHIPPED | OUTPATIENT
Start: 2020-11-19 | End: 2021-01-04 | Stop reason: SDUPTHER

## 2020-11-19 NOTE — LETTER
[facility]  [address]  [facility] [address]  Phone: [phone]  Fax: [phone]       November 11, 2020      [place] [address]      Ref Annie Perez    To Whom It May Concern    The above named patient is under medical care for several chronic medical conditions and because of this she is at risk for falls and injury to herself. The patient is in need of another person/adult to stay with her and because of this should not be left alone for long periods of time. The patient is on several medications and because of this she is also prone to extreme weakness, unsteady gait and dizziness. Her diagnosis include: Hypertension, Dyslipidemia, Osteopenia, Anemia and Intermittent Claudication. Any assistance you could give this patient is greatly appreciated    Sincerely            Kendra Kinney MD

## 2020-11-19 NOTE — PROGRESS NOTES
Subjective:      Patient ID: Brenda Perez is a 85 y.o. female.    Chief Complaint: Establish Care    HPI   Patient here to establish care and mainly to get a letter signed to have a sitter  H/o arterial stents in both legs no bleeding episodes on plavix. States Dr Villa placed stents  No smoking no alcohol   Refusing vaccinations  No acute complaints    Review of Systems   Constitutional: Negative for chills and fever.   HENT: Negative for ear pain and hearing loss.    Eyes: Negative for blurred vision.   Respiratory: Negative for cough, shortness of breath and wheezing.    Cardiovascular: Negative for chest pain, palpitations, claudication and leg swelling.   Gastrointestinal: Negative for abdominal pain, constipation, diarrhea, nausea and vomiting.   Genitourinary: Negative for dysuria, frequency and urgency.   Musculoskeletal: Negative for falls and joint pain.   Neurological: Negative for dizziness and headaches.   Psychiatric/Behavioral: Negative for depression.     Objective:     Physical Exam  Constitutional:       General: She is not in acute distress.     Appearance: Normal appearance. She is normal weight.   HENT:      Head: Normocephalic.      Mouth/Throat:      Pharynx: Oropharynx is clear.   Eyes:      Extraocular Movements: Extraocular movements intact.      Conjunctiva/sclera: Conjunctivae normal.      Pupils: Pupils are equal, round, and reactive to light.   Neck:      Musculoskeletal: Normal range of motion.   Cardiovascular:      Rate and Rhythm: Normal rate and regular rhythm.      Pulses: Normal pulses.   Pulmonary:      Effort: Pulmonary effort is normal.      Breath sounds: Normal breath sounds.   Abdominal:      General: Bowel sounds are normal.      Palpations: Abdomen is soft.   Musculoskeletal: Normal range of motion.   Skin:     General: Skin is warm.      Capillary Refill: Capillary refill takes less than 2 seconds.   Neurological:      General: No focal deficit present.      Mental  "Status: She is alert.   Psychiatric:         Mood and Affect: Mood normal.        BP (!) 160/98   Pulse 68   Temp 98.2 °F (36.8 °C)   Resp 18   Ht 5' 4" (1.626 m)   Wt 61.7 kg (136 lb)   SpO2 98%   BMI 23.34 kg/m²     Assessment:       ICD-10-CM ICD-9-CM   1. Hyperlipidemia, unspecified hyperlipidemia type  E78.5 272.4   2. Essential hypertension  I10 401.9   3. PAD (peripheral artery disease)  I73.9 443.9   4. Gastroesophageal reflux disease without esophagitis  K21.9 530.81       Plan:     Medication List with Changes/Refills   Current Medications    AMLODIPINE (NORVASC) 5 MG TABLET    amlodipine 5 mg tablet  TK 1 T PO QD    ASPIRIN (ASPIR-81) 81 MG EC TABLET    Aspir-81   take 1 tablet daily    CLONIDINE (CATAPRES) 0.1 MG TABLET    Take 1 tablet (0.1 mg total) by mouth daily as needed.    GABAPENTIN (NEURONTIN) 300 MG CAPSULE    gabapentin 300 mg capsule   TK ONE C PO  QHS    LISINOPRIL (PRINIVIL,ZESTRIL) 40 MG TABLET    TAKE 1 TABLET BY MOUTH ONCE DAILY   Changed and/or Refilled Medications    Modified Medication Previous Medication    CHOLECALCIFEROL, VITAMIN D3, 125 MCG (5,000 UNIT) CAPSULE cholecalciferol, vitamin D3, 5,000 unit capsule       cholecalciferol (vitamin D3) 5,000 unit capsule   TK ONE C PO  QD WITH  A MEAL    cholecalciferol (vitamin D3) 5,000 unit capsule   TK ONE C PO  QD WITH  A MEAL    CILOSTAZOL (PLETAL) 50 MG TAB cilostazol (PLETAL) 50 MG Tab       Take 1 tablet (50 mg total) by mouth 2 (two) times daily.    Take 1 tablet by mouth 2 (two) times daily.    CLOPIDOGREL (PLAVIX) 75 MG TABLET clopidogreL (PLAVIX) 75 mg tablet       Take 1 tablet (75 mg total) by mouth once daily.    Take 75 mg by mouth once daily.    ESOMEPRAZOLE (NEXIUM) 40 MG CAPSULE esomeprazole (NEXIUM) 40 MG capsule       Take 1 capsule (40 mg total) by mouth before breakfast.    Take 1 capsule (40 mg total) by mouth before breakfast.    FERROUS SULFATE (FEOSOL) 325 MG (65 MG IRON) TAB TABLET ferrous sulfate " (FEOSOL) 325 mg (65 mg iron) Tab tablet       ferrous sulfate 325 mg (65 mg iron) tablet  TK 1 T PO QD WITH MEALS    ferrous sulfate 325 mg (65 mg iron) tablet  TK 1 T PO QD WITH MEALS    ORPHENADRINE (NORFLEX) 100 MG TABLET orphenadrine (NORFLEX) 100 mg tablet       Take 1 tablet (100 mg total) by mouth 2 (two) times daily.    Take 100 mg by mouth 2 (two) times daily.    PRAVASTATIN (PRAVACHOL) 20 MG TABLET pravastatin (PRAVACHOL) 20 MG tablet       Take 1 tablet (20 mg total) by mouth once daily.    Take 1 tablet (20 mg total) by mouth once daily.        Hyperlipidemia, unspecified hyperlipidemia type  -     Lipid Panel; Future; Expected date: 11/19/2020    Essential hypertension  -     Comprehensive Metabolic Panel; Future; Expected date: 11/19/2020  -     CBC Auto Differential; Future; Expected date: 11/19/2020    PAD (peripheral artery disease)    Gastroesophageal reflux disease without esophagitis         Needs to monitor blood pressure and if still elevated will need to increase medications

## 2020-11-20 ENCOUNTER — TELEPHONE (OUTPATIENT)
Dept: PRIMARY CARE CLINIC | Facility: CLINIC | Age: 85
End: 2020-11-20

## 2020-11-20 LAB
ALBUMIN SERPL-MCNC: 4.3 G/DL (ref 3.6–5.1)
ALBUMIN/GLOB SERPL: 1.5 (CALC) (ref 1–2.5)
ALP SERPL-CCNC: 74 U/L (ref 37–153)
ALT SERPL-CCNC: 13 U/L (ref 6–29)
AST SERPL-CCNC: 16 U/L (ref 10–35)
BASOPHILS # BLD AUTO: 42 CELLS/UL (ref 0–200)
BASOPHILS NFR BLD AUTO: 0.7 %
BILIRUB SERPL-MCNC: 0.4 MG/DL (ref 0.2–1.2)
BUN SERPL-MCNC: 17 MG/DL (ref 7–25)
BUN/CREAT SERPL: 19 (CALC) (ref 6–22)
CALCIUM SERPL-MCNC: 10.1 MG/DL (ref 8.6–10.4)
CHLORIDE SERPL-SCNC: 100 MMOL/L (ref 98–110)
CHOLEST SERPL-MCNC: 202 MG/DL
CHOLEST/HDLC SERPL: 2 (CALC)
CO2 SERPL-SCNC: 28 MMOL/L (ref 20–32)
CREAT SERPL-MCNC: 0.9 MG/DL (ref 0.6–0.88)
EOSINOPHIL # BLD AUTO: 72 CELLS/UL (ref 15–500)
EOSINOPHIL NFR BLD AUTO: 1.2 %
ERYTHROCYTE [DISTWIDTH] IN BLOOD BY AUTOMATED COUNT: 15.1 % (ref 11–15)
GFRSERPLBLD MDRD-ARVRAT: 59 ML/MIN/1.73M2
GLOBULIN SER CALC-MCNC: 2.9 G/DL (CALC) (ref 1.9–3.7)
GLUCOSE SERPL-MCNC: 83 MG/DL (ref 65–99)
HCT VFR BLD AUTO: 43.8 % (ref 35–45)
HDLC SERPL-MCNC: 103 MG/DL
HGB BLD-MCNC: 12.9 G/DL (ref 11.7–15.5)
LDLC SERPL CALC-MCNC: 85 MG/DL (CALC)
LYMPHOCYTES # BLD AUTO: 2034 CELLS/UL (ref 850–3900)
LYMPHOCYTES NFR BLD AUTO: 33.9 %
MCH RBC QN AUTO: 21.8 PG (ref 27–33)
MCHC RBC AUTO-ENTMCNC: 29.5 G/DL (ref 32–36)
MCV RBC AUTO: 74.1 FL (ref 80–100)
MONOCYTES # BLD AUTO: 468 CELLS/UL (ref 200–950)
MONOCYTES NFR BLD AUTO: 7.8 %
NEUTROPHILS # BLD AUTO: 3384 CELLS/UL (ref 1500–7800)
NEUTROPHILS NFR BLD AUTO: 56.4 %
NONHDLC SERPL-MCNC: 99 MG/DL (CALC)
PLATELET # BLD AUTO: 319 THOUSAND/UL (ref 140–400)
PMV BLD REES-ECKER: 8.9 FL (ref 7.5–12.5)
POTASSIUM SERPL-SCNC: 3.8 MMOL/L (ref 3.5–5.3)
PROT SERPL-MCNC: 7.2 G/DL (ref 6.1–8.1)
RBC # BLD AUTO: 5.91 MILLION/UL (ref 3.8–5.1)
SODIUM SERPL-SCNC: 139 MMOL/L (ref 135–146)
TRIGL SERPL-MCNC: 59 MG/DL
WBC # BLD AUTO: 6 THOUSAND/UL (ref 3.8–10.8)

## 2020-11-20 NOTE — TELEPHONE ENCOUNTER
----- Message from Ashley Edwards sent at 11/20/2020  8:58 AM CST -----  Regarding: call back  Carmel Montalvo called in regards of Brenda Perez and she stated she need an additional form filled in order for the pt. To receive a live in aide. Please call back at [phone]

## 2020-12-02 ENCOUNTER — TELEPHONE (OUTPATIENT)
Dept: PRIMARY CARE CLINIC | Facility: CLINIC | Age: 85
End: 2020-12-02

## 2020-12-02 NOTE — TELEPHONE ENCOUNTER
Pt's insurance does not cover orphenadrine citrate 100mg tabs, her insurance will cover baclofen, please advise.

## 2020-12-03 RX ORDER — BACLOFEN 5 MG/1
5 TABLET ORAL 2 TIMES DAILY
COMMUNITY
End: 2020-12-03 | Stop reason: SDUPTHER

## 2020-12-03 RX ORDER — BACLOFEN 5 MG/1
5 TABLET ORAL 2 TIMES DAILY
Qty: 30 TABLET | Refills: 0 | Status: SHIPPED | OUTPATIENT
Start: 2020-12-03 | End: 2021-01-04 | Stop reason: SDUPTHER

## 2020-12-04 ENCOUNTER — TELEPHONE (OUTPATIENT)
Dept: PRIMARY CARE CLINIC | Facility: CLINIC | Age: 85
End: 2020-12-04

## 2020-12-04 NOTE — TELEPHONE ENCOUNTER
----- Message from Mark Borges sent at 12/3/2020 10:35 AM CST -----  Regarding: Paperwork question  Please call Brenda to discuss a question she has about some paperwork [phone] (home). This is regarding something to do about a house the patient is trying to move into.

## 2021-01-04 DIAGNOSIS — D50.8 IRON DEFICIENCY ANEMIA SECONDARY TO INADEQUATE DIETARY IRON INTAKE: ICD-10-CM

## 2021-01-04 DIAGNOSIS — E78.5 HYPERLIPIDEMIA, UNSPECIFIED HYPERLIPIDEMIA TYPE: ICD-10-CM

## 2021-01-04 RX ORDER — PRAVASTATIN SODIUM 20 MG/1
20 TABLET ORAL DAILY
Qty: 90 TABLET | Refills: 3 | Status: SHIPPED | OUTPATIENT
Start: 2021-01-04 | End: 2021-12-15 | Stop reason: SDUPTHER

## 2021-01-04 RX ORDER — CILOSTAZOL 50 MG/1
50 TABLET ORAL 2 TIMES DAILY
Qty: 90 TABLET | Refills: 2 | Status: SHIPPED | OUTPATIENT
Start: 2021-01-04 | End: 2022-11-03 | Stop reason: ALTCHOICE

## 2021-01-04 RX ORDER — BACLOFEN 5 MG/1
5 TABLET ORAL 2 TIMES DAILY
Qty: 30 TABLET | Refills: 0 | Status: SHIPPED | OUTPATIENT
Start: 2021-01-04 | End: 2022-11-03 | Stop reason: ALTCHOICE

## 2021-01-04 RX ORDER — CLOPIDOGREL BISULFATE 75 MG/1
75 TABLET ORAL DAILY
Qty: 90 TABLET | Refills: 2 | Status: SHIPPED | OUTPATIENT
Start: 2021-01-04 | End: 2021-12-15 | Stop reason: SDUPTHER

## 2021-01-04 RX ORDER — CHOLECALCIFEROL (VITAMIN D3) 125 MCG
CAPSULE ORAL
Qty: 90 CAPSULE | Refills: 2 | Status: SHIPPED | OUTPATIENT
Start: 2021-01-04 | End: 2023-12-15 | Stop reason: SDUPTHER

## 2021-01-04 RX ORDER — CHOLECALCIFEROL (VITAMIN D3) 125 MCG
CAPSULE ORAL
Qty: 90 CAPSULE | Refills: 2 | Status: SHIPPED | OUTPATIENT
Start: 2021-01-04 | End: 2021-01-04 | Stop reason: SDUPTHER

## 2021-01-04 RX ORDER — FERROUS SULFATE 325(65) MG
TABLET ORAL
Qty: 90 TABLET | Refills: 3 | Status: SHIPPED | OUTPATIENT
Start: 2021-01-04 | End: 2021-06-15 | Stop reason: SDUPTHER

## 2021-01-13 DIAGNOSIS — I10 ESSENTIAL HYPERTENSION: Chronic | ICD-10-CM

## 2021-01-13 RX ORDER — AMLODIPINE BESYLATE 5 MG/1
TABLET ORAL
Qty: 90 TABLET | Refills: 1 | Status: SHIPPED | OUTPATIENT
Start: 2021-01-13 | End: 2022-07-27

## 2021-04-28 ENCOUNTER — TELEPHONE (OUTPATIENT)
Dept: PRIMARY CARE CLINIC | Facility: CLINIC | Age: 86
End: 2021-04-28

## 2021-04-28 ENCOUNTER — PATIENT OUTREACH (OUTPATIENT)
Dept: ADMINISTRATIVE | Facility: HOSPITAL | Age: 86
End: 2021-04-28

## 2021-06-15 ENCOUNTER — OFFICE VISIT (OUTPATIENT)
Dept: PRIMARY CARE CLINIC | Facility: CLINIC | Age: 86
End: 2021-06-15
Payer: MEDICARE

## 2021-06-15 VITALS
HEIGHT: 64 IN | WEIGHT: 137.13 LBS | DIASTOLIC BLOOD PRESSURE: 74 MMHG | TEMPERATURE: 98 F | HEART RATE: 73 BPM | SYSTOLIC BLOOD PRESSURE: 158 MMHG | BODY MASS INDEX: 23.41 KG/M2 | OXYGEN SATURATION: 99 %

## 2021-06-15 DIAGNOSIS — K21.9 GASTROESOPHAGEAL REFLUX DISEASE WITHOUT ESOPHAGITIS: ICD-10-CM

## 2021-06-15 DIAGNOSIS — D50.8 IRON DEFICIENCY ANEMIA SECONDARY TO INADEQUATE DIETARY IRON INTAKE: Primary | ICD-10-CM

## 2021-06-15 DIAGNOSIS — I10 ESSENTIAL HYPERTENSION: Chronic | ICD-10-CM

## 2021-06-15 DIAGNOSIS — Z28.21 COVID-19 VIRUS VACCINATION REFUSED: ICD-10-CM

## 2021-06-15 PROCEDURE — 3077F SYST BP >= 140 MM HG: CPT | Mod: CPTII,S$GLB,, | Performed by: INTERNAL MEDICINE

## 2021-06-15 PROCEDURE — 3078F DIAST BP <80 MM HG: CPT | Mod: CPTII,S$GLB,, | Performed by: INTERNAL MEDICINE

## 2021-06-15 PROCEDURE — 3078F PR MOST RECENT DIASTOLIC BLOOD PRESSURE < 80 MM HG: ICD-10-PCS | Mod: CPTII,S$GLB,, | Performed by: INTERNAL MEDICINE

## 2021-06-15 PROCEDURE — 3077F PR MOST RECENT SYSTOLIC BLOOD PRESSURE >= 140 MM HG: ICD-10-PCS | Mod: CPTII,S$GLB,, | Performed by: INTERNAL MEDICINE

## 2021-06-15 PROCEDURE — 1159F PR MEDICATION LIST DOCUMENTED IN MEDICAL RECORD: ICD-10-PCS | Mod: S$GLB,,, | Performed by: INTERNAL MEDICINE

## 2021-06-15 PROCEDURE — 1159F MED LIST DOCD IN RCRD: CPT | Mod: S$GLB,,, | Performed by: INTERNAL MEDICINE

## 2021-06-15 PROCEDURE — 99214 PR OFFICE/OUTPT VISIT, EST, LEVL IV, 30-39 MIN: ICD-10-PCS | Mod: S$GLB,,, | Performed by: INTERNAL MEDICINE

## 2021-06-15 PROCEDURE — 99214 OFFICE O/P EST MOD 30 MIN: CPT | Mod: S$GLB,,, | Performed by: INTERNAL MEDICINE

## 2021-06-15 RX ORDER — ESOMEPRAZOLE MAGNESIUM 40 MG/1
40 CAPSULE, DELAYED RELEASE ORAL
Qty: 90 CAPSULE | Refills: 3 | Status: SHIPPED | OUTPATIENT
Start: 2021-06-15 | End: 2022-07-22

## 2021-06-15 RX ORDER — CLONIDINE HYDROCHLORIDE 0.1 MG/1
0.1 TABLET ORAL DAILY PRN
Qty: 30 TABLET | Refills: 5 | Status: SHIPPED | OUTPATIENT
Start: 2021-06-15 | End: 2022-06-28

## 2021-06-15 RX ORDER — FERROUS SULFATE 325(65) MG
TABLET ORAL
Qty: 90 TABLET | Refills: 3 | Status: SHIPPED | OUTPATIENT
Start: 2021-06-15

## 2021-12-15 ENCOUNTER — OFFICE VISIT (OUTPATIENT)
Dept: PRIMARY CARE CLINIC | Facility: CLINIC | Age: 86
End: 2021-12-15
Payer: MEDICARE

## 2021-12-15 VITALS
HEART RATE: 74 BPM | DIASTOLIC BLOOD PRESSURE: 100 MMHG | SYSTOLIC BLOOD PRESSURE: 160 MMHG | BODY MASS INDEX: 25.34 KG/M2 | HEIGHT: 63 IN | OXYGEN SATURATION: 97 % | WEIGHT: 143 LBS

## 2021-12-15 DIAGNOSIS — E78.5 HYPERLIPIDEMIA, UNSPECIFIED HYPERLIPIDEMIA TYPE: ICD-10-CM

## 2021-12-15 DIAGNOSIS — I73.9 PAD (PERIPHERAL ARTERY DISEASE): ICD-10-CM

## 2021-12-15 DIAGNOSIS — I10 ESSENTIAL HYPERTENSION: Primary | ICD-10-CM

## 2021-12-15 PROCEDURE — 99397 PER PM REEVAL EST PAT 65+ YR: CPT | Mod: S$GLB,,, | Performed by: INTERNAL MEDICINE

## 2021-12-15 PROCEDURE — 99397 PR PREVENTIVE VISIT,EST,65 & OVER: ICD-10-PCS | Mod: S$GLB,,, | Performed by: INTERNAL MEDICINE

## 2021-12-15 RX ORDER — CLOPIDOGREL BISULFATE 75 MG/1
75 TABLET ORAL DAILY
Qty: 90 TABLET | Refills: 2 | Status: SHIPPED | OUTPATIENT
Start: 2021-12-15 | End: 2022-06-16 | Stop reason: ALTCHOICE

## 2021-12-15 RX ORDER — PRAVASTATIN SODIUM 20 MG/1
20 TABLET ORAL DAILY
Qty: 90 TABLET | Refills: 3 | Status: SHIPPED | OUTPATIENT
Start: 2021-12-15 | End: 2022-03-17

## 2021-12-16 LAB
BASOPHILS # BLD AUTO: 59 CELLS/UL (ref 0–200)
BASOPHILS NFR BLD AUTO: 1.1 %
BUN SERPL-MCNC: 25 MG/DL (ref 7–25)
BUN/CREAT SERPL: 24 (CALC) (ref 6–22)
CALCIUM SERPL-MCNC: 9.6 MG/DL (ref 8.6–10.4)
CHLORIDE SERPL-SCNC: 101 MMOL/L (ref 98–110)
CHOLEST SERPL-MCNC: 200 MG/DL
CHOLEST/HDLC SERPL: 2.1 (CALC)
CO2 SERPL-SCNC: 29 MMOL/L (ref 20–32)
CREAT SERPL-MCNC: 1.03 MG/DL (ref 0.6–0.88)
EOSINOPHIL # BLD AUTO: 151 CELLS/UL (ref 15–500)
EOSINOPHIL NFR BLD AUTO: 2.8 %
ERYTHROCYTE [DISTWIDTH] IN BLOOD BY AUTOMATED COUNT: 14.8 % (ref 11–15)
FERRITIN SERPL-MCNC: 227 NG/ML (ref 16–288)
GLUCOSE SERPL-MCNC: 89 MG/DL (ref 65–139)
HCT VFR BLD AUTO: 41.3 % (ref 35–45)
HDLC SERPL-MCNC: 94 MG/DL
HGB BLD-MCNC: 12.3 G/DL (ref 11.7–15.5)
IRON SATN MFR SERPL: 28 % (CALC) (ref 16–45)
IRON SERPL-MCNC: 81 MCG/DL (ref 45–160)
LDLC SERPL CALC-MCNC: 90 MG/DL (CALC)
LYMPHOCYTES # BLD AUTO: 2327 CELLS/UL (ref 850–3900)
LYMPHOCYTES NFR BLD AUTO: 43.1 %
MCH RBC QN AUTO: 22.7 PG (ref 27–33)
MCHC RBC AUTO-ENTMCNC: 29.8 G/DL (ref 32–36)
MCV RBC AUTO: 76.2 FL (ref 80–100)
MONOCYTES # BLD AUTO: 518 CELLS/UL (ref 200–950)
MONOCYTES NFR BLD AUTO: 9.6 %
NEUTROPHILS # BLD AUTO: 2344 CELLS/UL (ref 1500–7800)
NEUTROPHILS NFR BLD AUTO: 43.4 %
NONHDLC SERPL-MCNC: 106 MG/DL (CALC)
PLATELET # BLD AUTO: 237 THOUSAND/UL (ref 140–400)
PMV BLD REES-ECKER: 9.7 FL (ref 7.5–12.5)
POTASSIUM SERPL-SCNC: 4 MMOL/L (ref 3.5–5.3)
RBC # BLD AUTO: 5.42 MILLION/UL (ref 3.8–5.1)
SODIUM SERPL-SCNC: 138 MMOL/L (ref 135–146)
TIBC SERPL-MCNC: 285 MCG/DL (CALC) (ref 250–450)
TRIGL SERPL-MCNC: 75 MG/DL
WBC # BLD AUTO: 5.4 THOUSAND/UL (ref 3.8–10.8)

## 2022-02-01 ENCOUNTER — PATIENT MESSAGE (OUTPATIENT)
Dept: PRIMARY CARE CLINIC | Facility: CLINIC | Age: 87
End: 2022-02-01
Payer: MEDICARE

## 2022-06-16 ENCOUNTER — OFFICE VISIT (OUTPATIENT)
Dept: PRIMARY CARE CLINIC | Facility: CLINIC | Age: 87
End: 2022-06-16
Payer: MEDICARE

## 2022-06-16 VITALS
BODY MASS INDEX: 23.36 KG/M2 | WEIGHT: 136.81 LBS | HEIGHT: 64 IN | HEART RATE: 77 BPM | SYSTOLIC BLOOD PRESSURE: 160 MMHG | OXYGEN SATURATION: 98 % | DIASTOLIC BLOOD PRESSURE: 88 MMHG

## 2022-06-16 DIAGNOSIS — E78.5 HYPERLIPIDEMIA, UNSPECIFIED HYPERLIPIDEMIA TYPE: ICD-10-CM

## 2022-06-16 DIAGNOSIS — I73.9 PAD (PERIPHERAL ARTERY DISEASE): Primary | ICD-10-CM

## 2022-06-16 DIAGNOSIS — I10 ESSENTIAL HYPERTENSION: ICD-10-CM

## 2022-06-16 PROCEDURE — 3288F PR FALLS RISK ASSESSMENT DOCUMENTED: ICD-10-PCS | Mod: CPTII,S$GLB,, | Performed by: INTERNAL MEDICINE

## 2022-06-16 PROCEDURE — 99214 PR OFFICE/OUTPT VISIT, EST, LEVL IV, 30-39 MIN: ICD-10-PCS | Mod: S$GLB,,, | Performed by: INTERNAL MEDICINE

## 2022-06-16 PROCEDURE — 1101F PR PT FALLS ASSESS DOC 0-1 FALLS W/OUT INJ PAST YR: ICD-10-PCS | Mod: CPTII,S$GLB,, | Performed by: INTERNAL MEDICINE

## 2022-06-16 PROCEDURE — 1159F MED LIST DOCD IN RCRD: CPT | Mod: CPTII,S$GLB,, | Performed by: INTERNAL MEDICINE

## 2022-06-16 PROCEDURE — 99214 OFFICE O/P EST MOD 30 MIN: CPT | Mod: S$GLB,,, | Performed by: INTERNAL MEDICINE

## 2022-06-16 PROCEDURE — 1159F PR MEDICATION LIST DOCUMENTED IN MEDICAL RECORD: ICD-10-PCS | Mod: CPTII,S$GLB,, | Performed by: INTERNAL MEDICINE

## 2022-06-16 PROCEDURE — 1101F PT FALLS ASSESS-DOCD LE1/YR: CPT | Mod: CPTII,S$GLB,, | Performed by: INTERNAL MEDICINE

## 2022-06-16 PROCEDURE — 3288F FALL RISK ASSESSMENT DOCD: CPT | Mod: CPTII,S$GLB,, | Performed by: INTERNAL MEDICINE

## 2022-06-16 RX ORDER — ATORVASTATIN CALCIUM 40 MG/1
40 TABLET, FILM COATED ORAL NIGHTLY
COMMUNITY
Start: 2022-06-02

## 2022-06-16 NOTE — PROGRESS NOTES
"Subjective:      Patient ID: Brenda Perez is a 87 y.o. female.    Chief Complaint: Follow-up    HPI     Here for follow up. Denies any acute complaints      Review of Systems   Constitutional: Negative for chills and fever.   Respiratory: Negative for cough, shortness of breath and wheezing.    Cardiovascular: Negative for chest pain, palpitations and leg swelling.   Gastrointestinal: Negative for abdominal pain, constipation, diarrhea, nausea and vomiting.   Genitourinary: Negative for dysuria, frequency and urgency.   Musculoskeletal: Negative for falls.   Skin: Negative for rash.   Neurological: Negative for dizziness and headaches.   Endo/Heme/Allergies: Does not bruise/bleed easily.   Psychiatric/Behavioral: Negative for depression. The patient is not nervous/anxious.      Objective:     Physical Exam  Vitals reviewed.   Constitutional:       Appearance: Normal appearance.   HENT:      Head: Normocephalic.   Eyes:      Extraocular Movements: Extraocular movements intact.      Conjunctiva/sclera: Conjunctivae normal.      Pupils: Pupils are equal, round, and reactive to light.   Cardiovascular:      Rate and Rhythm: Normal rate and regular rhythm.   Pulmonary:      Effort: Pulmonary effort is normal.      Breath sounds: Normal breath sounds.   Abdominal:      General: Bowel sounds are normal.   Musculoskeletal:      Right lower leg: No edema.      Left lower leg: No edema.   Skin:     General: Skin is warm.      Capillary Refill: Capillary refill takes less than 2 seconds.   Neurological:      General: No focal deficit present.      Mental Status: She is alert and oriented to person, place, and time.   Psychiatric:         Mood and Affect: Mood normal.        BP (!) 160/88 (BP Location: Right arm, Patient Position: Sitting, BP Method: Medium (Manual))   Pulse 77   Ht 5' 4" (1.626 m)   Wt 62.1 kg (136 lb 12.8 oz)   SpO2 98%   BMI 23.48 kg/m²     Assessment:       ICD-10-CM ICD-9-CM   1. PAD (peripheral " artery disease)  I73.9 443.9   2. Hyperlipidemia, unspecified hyperlipidemia type  E78.5 272.4   3. Essential hypertension  I10 401.9       Plan:     Medication List with Changes/Refills   Current Medications    AMLODIPINE (NORVASC) 5 MG TABLET    amlodipine 5 mg tablet  TK 1 T PO QD    ASPIRIN (ECOTRIN) 81 MG EC TABLET    Aspir-81   take 1 tablet daily    ATORVASTATIN (LIPITOR) 40 MG TABLET    Take 40 mg by mouth every evening.    BACLOFEN (LIORESAL) 5 MG TAB TABLET    Take 1 tablet (5 mg total) by mouth 2 (two) times daily.    CHOLECALCIFEROL, VITAMIN D3, 125 MCG (5,000 UNIT) CAPSULE    cholecalciferol (vitamin D3) 5,000 unit capsule   TK ONE C PO  QD WITH  A MEAL    CILOSTAZOL (PLETAL) 50 MG TAB    Take 1 tablet (50 mg total) by mouth 2 (two) times daily.    CLONIDINE (CATAPRES) 0.1 MG TABLET    Take 1 tablet (0.1 mg total) by mouth daily as needed.    ESOMEPRAZOLE (NEXIUM) 40 MG CAPSULE    Take 1 capsule (40 mg total) by mouth before breakfast.    FERROUS SULFATE (FEOSOL) 325 MG (65 MG IRON) TAB TABLET    ferrous sulfate 325 mg (65 mg iron) tablet  TK 1 T PO QD WITH MEALS    GABAPENTIN (NEURONTIN) 300 MG CAPSULE    gabapentin 300 mg capsule   TK ONE C PO  QHS    LISINOPRIL (PRINIVIL,ZESTRIL) 40 MG TABLET    TAKE 1 TABLET BY MOUTH ONCE DAILY   Discontinued Medications    CLOPIDOGREL (PLAVIX) 75 MG TABLET    Take 1 tablet (75 mg total) by mouth once daily.    ORPHENADRINE (NORFLEX) 100 MG TABLET    Take 1 tablet (100 mg total) by mouth 2 (two) times daily.    PRAVASTATIN (PRAVACHOL) 20 MG TABLET    TAKE 1 TABLET(20 MG) BY MOUTH EVERY DAY        PAD (peripheral artery disease)    Hyperlipidemia, unspecified hyperlipidemia type    Essential hypertension       Future Appointments   Date Time Provider Department Center   12/16/2022 11:00 AM Kendra Kinney MD [facility]       Need BP readings from home. Patient reports it is controlled

## 2022-07-27 ENCOUNTER — OFFICE VISIT (OUTPATIENT)
Dept: PRIMARY CARE CLINIC | Facility: CLINIC | Age: 87
End: 2022-07-27
Payer: MEDICARE

## 2022-07-27 VITALS
OXYGEN SATURATION: 100 % | WEIGHT: 134.63 LBS | BODY MASS INDEX: 22.99 KG/M2 | HEART RATE: 85 BPM | SYSTOLIC BLOOD PRESSURE: 150 MMHG | HEIGHT: 64 IN | DIASTOLIC BLOOD PRESSURE: 80 MMHG

## 2022-07-27 DIAGNOSIS — I10 ESSENTIAL HYPERTENSION: Primary | ICD-10-CM

## 2022-07-27 PROCEDURE — 3288F FALL RISK ASSESSMENT DOCD: CPT | Mod: CPTII,S$GLB,, | Performed by: INTERNAL MEDICINE

## 2022-07-27 PROCEDURE — 99214 PR OFFICE/OUTPT VISIT, EST, LEVL IV, 30-39 MIN: ICD-10-PCS | Mod: S$GLB,,, | Performed by: INTERNAL MEDICINE

## 2022-07-27 PROCEDURE — 1101F PR PT FALLS ASSESS DOC 0-1 FALLS W/OUT INJ PAST YR: ICD-10-PCS | Mod: CPTII,S$GLB,, | Performed by: INTERNAL MEDICINE

## 2022-07-27 PROCEDURE — 99214 OFFICE O/P EST MOD 30 MIN: CPT | Mod: S$GLB,,, | Performed by: INTERNAL MEDICINE

## 2022-07-27 PROCEDURE — 1159F PR MEDICATION LIST DOCUMENTED IN MEDICAL RECORD: ICD-10-PCS | Mod: CPTII,S$GLB,, | Performed by: INTERNAL MEDICINE

## 2022-07-27 PROCEDURE — 3288F PR FALLS RISK ASSESSMENT DOCUMENTED: ICD-10-PCS | Mod: CPTII,S$GLB,, | Performed by: INTERNAL MEDICINE

## 2022-07-27 PROCEDURE — 1101F PT FALLS ASSESS-DOCD LE1/YR: CPT | Mod: CPTII,S$GLB,, | Performed by: INTERNAL MEDICINE

## 2022-07-27 PROCEDURE — 1159F MED LIST DOCD IN RCRD: CPT | Mod: CPTII,S$GLB,, | Performed by: INTERNAL MEDICINE

## 2022-07-27 RX ORDER — AMLODIPINE BESYLATE 10 MG/1
10 TABLET ORAL DAILY
Qty: 30 TABLET | Refills: 11 | Status: SHIPPED | OUTPATIENT
Start: 2022-07-27 | End: 2023-02-02 | Stop reason: SDUPTHER

## 2022-07-27 RX ORDER — CLOPIDOGREL BISULFATE 75 MG/1
75 TABLET ORAL DAILY
COMMUNITY
Start: 2022-07-06

## 2022-07-27 RX ORDER — FUROSEMIDE 20 MG/1
20 TABLET ORAL DAILY
COMMUNITY
Start: 2022-07-07 | End: 2022-07-27 | Stop reason: ALTCHOICE

## 2022-07-27 NOTE — PROGRESS NOTES
Subjective:      Patient ID: Brenda Perez is a 87 y.o. female.    Chief Complaint: Hospital Follow Up    HPI     History of Present Illness  Patient is an 87 year old female with hypertension, peripheral vascular disease, recent lower extremity stent placement, hyperlipidemia, and GERD who presented to the ED on 7/17/22 with complaints of left lower extremity pain. She states the pain is severe, but improved with pain medication. She has a history of PVD with recent intervention by Dr. Tree Cameron. She denies chest pain, shortness of breath, nausea, vomiting, change in bowel and bladder.    Imaging:  ----------------------------  Left lower extremity venous duplex ultrasound    History: Left lower extremity pain.    COMPARISON: None    FINDINGS: Left lower extremity venous duplex exam was performed by the sonographer. Static images are submitted. Common femoral vein, superficial femoral vein, and popliteal vein demonstrate color flow and compressibility. Augmentation is noted throughout imaged lower extremity veins. Calf veins where visualized demonstrate color flow and compressibility.    IMPRESSION:    1. No evidence of left lower extremity DVT.  ------------------------------  Study: Bilateral lower extremity ultrasound with Doppler.    History: Leg pain. Recent right leg stent.    Comparison: None.    Technique: Grayscale, color, and pulsed Doppler images of the left leg were obtained.    Findings:    Abnormal monophasic arterial waveforms in the left common femoral artery and profunda. Left proximal and mid SFA are occluded with monophasic flow in the distal SFA and popliteal artery. Monophasic flow in the proximal posterior tibial artery with no significant flow in the mid and distal posterior tibial artery or dorsalis pedis artery. Velocities are as follows (cm/sec):    LEFT LOWER EXTREMITY:  Common femoral artery: 21  Superficial femoral artery: Proximal: 0, Mid: 0, Distal: 39,  Popliteal artery:  Proximal 50, Distal: 52  Posterior tibial artery: Proximal 63, Mid 0, Distal: 0  Dorsalis pedis: 0    Impression:    Severe peripheral arterial disease with long segment occlusion of the proximal and mid SFA, mid to distal posterior tibial artery and dorsalis pedis artery. There is also severe inflow disease with weak monophasic waveforms in the common femoral artery.  Hospital Course  87 year old female with hypertension, peripheral vascular disease, recent lower extremity stent placement, hyperlipidemia, and GERD who presented to the ED on 7/17/22 with complaints of left lower extremity pain.  Patient was admitted for Peripheral artery disease with long segment occlusion of prox and mid SFA, mid to distal post tib art and dorsalis pedis artery.  Initiated on heparin drip, cardiology was consulted and patient underwent peripheral angiogram with stenting, thrombectomy.  Cardiology recommended antiplatelet therapy with Plavix and using Xarelto 2.5 mg daily.  Discussed with Dr. Cameron and Plavix and Xarelto agreed on.  Patient advised to take medications as prescribed to prevent stent stenosis.  Will continue high intensity statins.  Cardiology follow-up.  Patient reconciliation as below, and patient will need to follow-up with PCP to continue monitoring of renal function and readjust antihypertensive medication as needed.          Review of Systems   Constitutional: Negative for chills and fever.   Respiratory: Negative for cough, shortness of breath and wheezing.    Cardiovascular: Negative for chest pain, palpitations and leg swelling.   Gastrointestinal: Negative for abdominal pain, constipation, diarrhea, nausea and vomiting.   Genitourinary: Negative for dysuria, frequency and urgency.   Musculoskeletal: Negative for falls and joint pain.   Neurological: Negative for dizziness and headaches.   Endo/Heme/Allergies: Does not bruise/bleed easily.   Psychiatric/Behavioral: Negative for depression. The patient is not  "nervous/anxious.      Objective:     Physical Exam  Vitals reviewed.   Constitutional:       Appearance: Normal appearance.   HENT:      Head: Normocephalic.   Eyes:      Extraocular Movements: Extraocular movements intact.      Conjunctiva/sclera: Conjunctivae normal.      Pupils: Pupils are equal, round, and reactive to light.   Cardiovascular:      Rate and Rhythm: Normal rate and regular rhythm.   Pulmonary:      Effort: Pulmonary effort is normal.      Breath sounds: Normal breath sounds.   Abdominal:      General: Bowel sounds are normal.   Musculoskeletal:      Right lower leg: No edema.      Left lower leg: No edema.   Skin:     General: Skin is warm.      Capillary Refill: Capillary refill takes less than 2 seconds.   Neurological:      General: No focal deficit present.      Mental Status: She is alert and oriented to person, place, and time.   Psychiatric:         Mood and Affect: Mood normal.        BP (!) 150/80 (BP Location: Right arm, Patient Position: Sitting, BP Method: Medium (Manual))   Pulse 85   Ht 5' 4" (1.626 m)   Wt 61.1 kg (134 lb 9.6 oz)   SpO2 100%   BMI 23.10 kg/m²     Assessment:       ICD-10-CM ICD-9-CM   1. Essential hypertension  I10 401.9       Plan:     Medication List with Changes/Refills   New Medications    AMLODIPINE (NORVASC) 10 MG TABLET    Take 1 tablet (10 mg total) by mouth once daily.   Current Medications    ASPIRIN (ECOTRIN) 81 MG EC TABLET    Aspir-81   take 1 tablet daily    ATORVASTATIN (LIPITOR) 40 MG TABLET    Take 40 mg by mouth every evening.    BACLOFEN (LIORESAL) 5 MG TAB TABLET    Take 1 tablet (5 mg total) by mouth 2 (two) times daily.    CHOLECALCIFEROL, VITAMIN D3, 125 MCG (5,000 UNIT) CAPSULE    cholecalciferol (vitamin D3) 5,000 unit capsule   TK ONE C PO  QD WITH  A MEAL    CILOSTAZOL (PLETAL) 50 MG TAB    Take 1 tablet (50 mg total) by mouth 2 (two) times daily.    CLONIDINE (CATAPRES) 0.1 MG TABLET    TAKE 1 TABLET(0.1 MG) BY MOUTH DAILY AS NEEDED "    CLOPIDOGREL (PLAVIX) 75 MG TABLET    Take 75 mg by mouth once daily.    ESOMEPRAZOLE (NEXIUM) 40 MG CAPSULE    TAKE 1 CAPSULE(40 MG) BY MOUTH BEFORE BREAKFAST    FERROUS SULFATE (FEOSOL) 325 MG (65 MG IRON) TAB TABLET    ferrous sulfate 325 mg (65 mg iron) tablet  TK 1 T PO QD WITH MEALS    GABAPENTIN (NEURONTIN) 300 MG CAPSULE    gabapentin 300 mg capsule   TK ONE C PO  QHS    RIVAROXABAN (XARELTO) 2.5 MG TAB    Take by mouth once daily. OUT OF STOCK AT PHARMACY-WAITING TO START   Discontinued Medications    AMLODIPINE (NORVASC) 5 MG TABLET    amlodipine 5 mg tablet  TK 1 T PO QD    FUROSEMIDE (LASIX) 20 MG TABLET    Take 20 mg by mouth once daily.    LISINOPRIL (PRINIVIL,ZESTRIL) 40 MG TABLET    TAKE 1 TABLET BY MOUTH ONCE DAILY    RIVAROXABAN (XARELTO) 2.5 MG TAB    Take 2.5 mg by mouth once daily at 6am.        Essential hypertension  -     amLODIPine (NORVASC) 10 MG tablet; Take 1 tablet (10 mg total) by mouth once daily.  Dispense: 30 tablet; Refill: 11       I do not have any samples for xarelto but I did give her a coupon. She will check with the pharmacy about availability and let us know if it needs to be sent to another pharmacy            Future Appointments   Date Time Provider Department [place]   8/29/2022 11:00 AM Kendra Kinney MD [facility]   12/16/2022 11:00 AM Kendra Kinney MD [facility]

## 2022-11-03 ENCOUNTER — OFFICE VISIT (OUTPATIENT)
Dept: PRIMARY CARE CLINIC | Facility: CLINIC | Age: 87
End: 2022-11-03
Payer: MEDICARE

## 2022-11-03 VITALS
DIASTOLIC BLOOD PRESSURE: 79 MMHG | BODY MASS INDEX: 23.32 KG/M2 | HEART RATE: 71 BPM | WEIGHT: 136.63 LBS | SYSTOLIC BLOOD PRESSURE: 165 MMHG | HEIGHT: 64 IN | OXYGEN SATURATION: 100 %

## 2022-11-03 DIAGNOSIS — I73.9 PAD (PERIPHERAL ARTERY DISEASE): ICD-10-CM

## 2022-11-03 DIAGNOSIS — E78.5 HYPERLIPIDEMIA, UNSPECIFIED HYPERLIPIDEMIA TYPE: ICD-10-CM

## 2022-11-03 DIAGNOSIS — I10 ESSENTIAL HYPERTENSION: Primary | Chronic | ICD-10-CM

## 2022-11-03 PROCEDURE — 1101F PT FALLS ASSESS-DOCD LE1/YR: CPT | Mod: CPTII,S$GLB,, | Performed by: INTERNAL MEDICINE

## 2022-11-03 PROCEDURE — 1159F PR MEDICATION LIST DOCUMENTED IN MEDICAL RECORD: ICD-10-PCS | Mod: CPTII,S$GLB,, | Performed by: INTERNAL MEDICINE

## 2022-11-03 PROCEDURE — 99214 PR OFFICE/OUTPT VISIT, EST, LEVL IV, 30-39 MIN: ICD-10-PCS | Mod: S$GLB,,, | Performed by: INTERNAL MEDICINE

## 2022-11-03 PROCEDURE — 1101F PR PT FALLS ASSESS DOC 0-1 FALLS W/OUT INJ PAST YR: ICD-10-PCS | Mod: CPTII,S$GLB,, | Performed by: INTERNAL MEDICINE

## 2022-11-03 PROCEDURE — 3288F FALL RISK ASSESSMENT DOCD: CPT | Mod: CPTII,S$GLB,, | Performed by: INTERNAL MEDICINE

## 2022-11-03 PROCEDURE — 3288F PR FALLS RISK ASSESSMENT DOCUMENTED: ICD-10-PCS | Mod: CPTII,S$GLB,, | Performed by: INTERNAL MEDICINE

## 2022-11-03 PROCEDURE — 99214 OFFICE O/P EST MOD 30 MIN: CPT | Mod: S$GLB,,, | Performed by: INTERNAL MEDICINE

## 2022-11-03 PROCEDURE — 1159F MED LIST DOCD IN RCRD: CPT | Mod: CPTII,S$GLB,, | Performed by: INTERNAL MEDICINE

## 2022-11-03 RX ORDER — FUROSEMIDE 20 MG/1
20 TABLET ORAL DAILY
COMMUNITY
End: 2022-11-03 | Stop reason: SDUPTHER

## 2022-11-03 RX ORDER — CLONIDINE HYDROCHLORIDE 0.1 MG/1
TABLET ORAL
Qty: 30 TABLET | Refills: 5 | Status: SHIPPED | OUTPATIENT
Start: 2022-11-03 | End: 2023-02-02 | Stop reason: SDUPTHER

## 2022-11-03 RX ORDER — FUROSEMIDE 20 MG/1
20 TABLET ORAL DAILY
Qty: 90 TABLET | Refills: 3 | Status: SHIPPED | OUTPATIENT
Start: 2022-11-03 | End: 2023-02-02 | Stop reason: SDUPTHER

## 2022-11-03 NOTE — PROGRESS NOTES
Subjective:      Patient ID: Brenda Perez is a 87 y.o. female.    Chief Complaint: Follow-up (not eating-no appetite; overheated-get hot in the face and want to pass out. Started about a month ago. ), Leg Pain (Pain to the back of the knee on the left leg. Described as aching. She states both of her knee's swell. ), and Anorexia (LOSS OF APPETITE SINCE THE FALL SHE HAD. SHE EATS EVERYDAY BUT JUST HAS TO FORCE HERSELF AND SOME DAYS SHE DOES NOT WANT ANYTHING. )    HPI    As above. Patient states she is better, she did eat quite a bit yesterday. Iron levels last visit were normal.   Has lost 7 lbs in 6 months. No GI symptoms reported   Daughter states cilostazol wasn't getting covered by insurance  Her BMI is normal   She sees Dr Tree Cameron for her PVD  Patient was only taking 5 mg of amlodipine but I had increased it to 10 mg         Review of Systems   Constitutional:  Positive for weight loss. Negative for chills and fever.   Eyes:  Negative for blurred vision.   Respiratory:  Negative for cough, shortness of breath and wheezing.    Cardiovascular:  Negative for chest pain, palpitations and leg swelling.   Gastrointestinal:  Negative for abdominal pain, constipation, diarrhea, nausea and vomiting.   Genitourinary:  Negative for dysuria, frequency and urgency.   Musculoskeletal:  Positive for myalgias.   Skin:  Negative for rash.   Neurological:  Negative for dizziness and headaches.   Psychiatric/Behavioral:  Negative for depression. The patient is not nervous/anxious.    Objective:     Physical Exam  Vitals reviewed.   Constitutional:       Appearance: Normal appearance.   HENT:      Head: Normocephalic.   Eyes:      Extraocular Movements: Extraocular movements intact.      Conjunctiva/sclera: Conjunctivae normal.      Pupils: Pupils are equal, round, and reactive to light.   Cardiovascular:      Rate and Rhythm: Normal rate and regular rhythm.   Pulmonary:      Effort: Pulmonary effort is normal.       "Breath sounds: Normal breath sounds.   Abdominal:      General: Bowel sounds are normal.   Musculoskeletal:         General: Normal range of motion.      Right lower leg: No edema.      Left lower leg: No edema.   Skin:     General: Skin is warm.      Capillary Refill: Capillary refill takes less than 2 seconds.   Neurological:      General: No focal deficit present.      Mental Status: She is alert and oriented to person, place, and time.   Psychiatric:         Mood and Affect: Mood normal.     BP (!) 165/79 (BP Location: Left arm, Patient Position: Sitting, BP Method: Medium (Automatic))   Pulse 71   Ht 5' 4" (1.626 m)   Wt 62 kg (136 lb 9.6 oz)   SpO2 100%   BMI 23.45 kg/m²     Assessment:       ICD-10-CM ICD-9-CM   1. Essential hypertension Poorly controlled I10 401.9   2. Hyperlipidemia, unspecified hyperlipidemia type  E78.5 272.4   3. PAD (peripheral artery disease)  I73.9 443.9       Plan:     Medication List with Changes/Refills   Current Medications    AMLODIPINE (NORVASC) 10 MG TABLET    Take 1 tablet (10 mg total) by mouth once daily.    ASPIRIN (ECOTRIN) 81 MG EC TABLET    Aspir-81   take 1 tablet daily    ATORVASTATIN (LIPITOR) 40 MG TABLET    Take 40 mg by mouth every evening.    CHOLECALCIFEROL, VITAMIN D3, 125 MCG (5,000 UNIT) CAPSULE    cholecalciferol (vitamin D3) 5,000 unit capsule   TK ONE C PO  QD WITH  A MEAL    CLOPIDOGREL (PLAVIX) 75 MG TABLET    Take 75 mg by mouth once daily.    ESOMEPRAZOLE (NEXIUM) 40 MG CAPSULE    TAKE 1 CAPSULE(40 MG) BY MOUTH BEFORE BREAKFAST    FERROUS SULFATE (FEOSOL) 325 MG (65 MG IRON) TAB TABLET    ferrous sulfate 325 mg (65 mg iron) tablet  TK 1 T PO QD WITH MEALS    GABAPENTIN (NEURONTIN) 300 MG CAPSULE    gabapentin 300 mg capsule   TK ONE C PO  QHS   Changed and/or Refilled Medications    Modified Medication Previous Medication    CLONIDINE (CATAPRES) 0.1 MG TABLET cloNIDine (CATAPRES) 0.1 MG tablet       TAKE 1 TABLET(0.1 MG) BY MOUTH DAILY AS NEEDED " Strength: 0.1 mg    TAKE 1 TABLET(0.1 MG) BY MOUTH DAILY AS NEEDED    FUROSEMIDE (LASIX) 20 MG TABLET furosemide (LASIX) 20 MG tablet       Take 1 tablet (20 mg total) by mouth once daily.    Take 20 mg by mouth once daily.    RIVAROXABAN (XARELTO) 2.5 MG TAB rivaroxaban (XARELTO) 2.5 mg Tab       Take 1 tablet (2.5 mg total) by mouth once daily.    Take by mouth once daily. OUT OF STOCK AT PHARMACY-WAITING TO START   Discontinued Medications    BACLOFEN (LIORESAL) 5 MG TAB TABLET    Take 1 tablet (5 mg total) by mouth 2 (two) times daily.    CILOSTAZOL (PLETAL) 50 MG TAB    Take 1 tablet (50 mg total) by mouth 2 (two) times daily.        Essential hypertension  Comments:  monitor and take clonidine when indicated  Orders:  -     cloNIDine (CATAPRES) 0.1 MG tablet; TAKE 1 TABLET(0.1 MG) BY MOUTH DAILY AS NEEDED Strength: 0.1 mg  Dispense: 30 tablet; Refill: 5  -     rivaroxaban (XARELTO) 2.5 mg Tab; Take 1 tablet (2.5 mg total) by mouth once daily.  Dispense: 90 tablet; Refill: 3  -     furosemide (LASIX) 20 MG tablet; Take 1 tablet (20 mg total) by mouth once daily.  Dispense: 90 tablet; Refill: 3  -     CBC Auto Differential; Future; Expected date: 11/03/2022  -     Comprehensive Metabolic Panel; Future; Expected date: 11/03/2022    Hyperlipidemia, unspecified hyperlipidemia type    PAD (peripheral artery disease)       Refusing vaccinations      Per discharge summary only plavix and xarelto to be continued. Follow up with Dr Cameron regarding PVD.     Advised to drink Boost/ensure if she cannot eat her whole meal\      Monitor BP, incraese amlodipine to 10 mg, if no improvement will change to procardia       Future Appointments   Date Time Provider Department Center   12/16/2022 11:00 AM Kendra Kinney MD [facility]   2/2/2023 11:40 AM Kendra Kinney MD [facility]

## 2022-11-04 ENCOUNTER — PATIENT MESSAGE (OUTPATIENT)
Dept: PRIMARY CARE CLINIC | Facility: CLINIC | Age: 87
End: 2022-11-04
Payer: MEDICARE

## 2022-11-04 LAB
ALBUMIN SERPL-MCNC: 4.2 G/DL (ref 3.6–5.1)
ALBUMIN/GLOB SERPL: 1.8 (CALC) (ref 1–2.5)
ALP SERPL-CCNC: 65 U/L (ref 37–153)
ALT SERPL-CCNC: 11 U/L (ref 6–29)
AST SERPL-CCNC: 14 U/L (ref 10–35)
BASOPHILS # BLD AUTO: 29 CELLS/UL (ref 0–200)
BASOPHILS NFR BLD AUTO: 0.5 %
BILIRUB SERPL-MCNC: 0.5 MG/DL (ref 0.2–1.2)
BUN SERPL-MCNC: 20 MG/DL (ref 7–25)
BUN/CREAT SERPL: NORMAL (CALC) (ref 6–22)
CALCIUM SERPL-MCNC: 9.6 MG/DL (ref 8.6–10.4)
CHLORIDE SERPL-SCNC: 103 MMOL/L (ref 98–110)
CO2 SERPL-SCNC: 31 MMOL/L (ref 20–32)
CREAT SERPL-MCNC: 0.91 MG/DL (ref 0.6–0.95)
EGFR: 61 ML/MIN/1.73M2
EOSINOPHIL # BLD AUTO: 131 CELLS/UL (ref 15–500)
EOSINOPHIL NFR BLD AUTO: 2.3 %
ERYTHROCYTE [DISTWIDTH] IN BLOOD BY AUTOMATED COUNT: 15.3 % (ref 11–15)
GLOBULIN SER CALC-MCNC: 2.3 G/DL (CALC) (ref 1.9–3.7)
GLUCOSE SERPL-MCNC: 86 MG/DL (ref 65–99)
HCT VFR BLD AUTO: 39.6 % (ref 35–45)
HGB BLD-MCNC: 12.1 G/DL (ref 11.7–15.5)
LYMPHOCYTES # BLD AUTO: 2172 CELLS/UL (ref 850–3900)
LYMPHOCYTES NFR BLD AUTO: 38.1 %
MCH RBC QN AUTO: 22.4 PG (ref 27–33)
MCHC RBC AUTO-ENTMCNC: 30.6 G/DL (ref 32–36)
MCV RBC AUTO: 73.5 FL (ref 80–100)
MONOCYTES # BLD AUTO: 462 CELLS/UL (ref 200–950)
MONOCYTES NFR BLD AUTO: 8.1 %
NEUTROPHILS # BLD AUTO: 2907 CELLS/UL (ref 1500–7800)
NEUTROPHILS NFR BLD AUTO: 51 %
PLATELET # BLD AUTO: 301 THOUSAND/UL (ref 140–400)
PMV BLD REES-ECKER: 9.2 FL (ref 7.5–12.5)
POTASSIUM SERPL-SCNC: 3.8 MMOL/L (ref 3.5–5.3)
PROT SERPL-MCNC: 6.5 G/DL (ref 6.1–8.1)
RBC # BLD AUTO: 5.39 MILLION/UL (ref 3.8–5.1)
SODIUM SERPL-SCNC: 142 MMOL/L (ref 135–146)
WBC # BLD AUTO: 5.7 THOUSAND/UL (ref 3.8–10.8)

## 2022-11-30 ENCOUNTER — OFFICE VISIT (OUTPATIENT)
Dept: PRIMARY CARE CLINIC | Facility: CLINIC | Age: 87
End: 2022-11-30
Payer: MEDICARE

## 2022-11-30 VITALS
HEIGHT: 64 IN | BODY MASS INDEX: 22.94 KG/M2 | WEIGHT: 134.38 LBS | SYSTOLIC BLOOD PRESSURE: 165 MMHG | DIASTOLIC BLOOD PRESSURE: 89 MMHG | OXYGEN SATURATION: 100 % | HEART RATE: 82 BPM

## 2022-11-30 DIAGNOSIS — J40 BRONCHITIS: Primary | ICD-10-CM

## 2022-11-30 PROCEDURE — 1101F PT FALLS ASSESS-DOCD LE1/YR: CPT | Mod: CPTII,S$GLB,, | Performed by: INTERNAL MEDICINE

## 2022-11-30 PROCEDURE — 99213 PR OFFICE/OUTPT VISIT, EST, LEVL III, 20-29 MIN: ICD-10-PCS | Mod: S$GLB,,, | Performed by: INTERNAL MEDICINE

## 2022-11-30 PROCEDURE — 3288F FALL RISK ASSESSMENT DOCD: CPT | Mod: CPTII,S$GLB,, | Performed by: INTERNAL MEDICINE

## 2022-11-30 PROCEDURE — 99213 OFFICE O/P EST LOW 20 MIN: CPT | Mod: S$GLB,,, | Performed by: INTERNAL MEDICINE

## 2022-11-30 PROCEDURE — 3288F PR FALLS RISK ASSESSMENT DOCUMENTED: ICD-10-PCS | Mod: CPTII,S$GLB,, | Performed by: INTERNAL MEDICINE

## 2022-11-30 PROCEDURE — 1101F PR PT FALLS ASSESS DOC 0-1 FALLS W/OUT INJ PAST YR: ICD-10-PCS | Mod: CPTII,S$GLB,, | Performed by: INTERNAL MEDICINE

## 2022-11-30 RX ORDER — AZITHROMYCIN 250 MG/1
TABLET, FILM COATED ORAL
Qty: 6 TABLET | Refills: 0 | Status: SHIPPED | OUTPATIENT
Start: 2022-11-30 | End: 2022-12-05

## 2022-11-30 RX ORDER — BENZONATATE 100 MG/1
100 CAPSULE ORAL 3 TIMES DAILY PRN
Qty: 30 CAPSULE | Refills: 0 | Status: SHIPPED | OUTPATIENT
Start: 2022-11-30 | End: 2022-12-10

## 2022-11-30 NOTE — PROGRESS NOTES
"Subjective:      Patient ID: Brenda Perez is a 87 y.o. female.    Chief Complaint: Cough (Dry for the past two weeks; ASA, Robitussin and tea) and Wheezing    HPI    As above      Review of Systems   Constitutional:  Negative for chills and fever.   Respiratory:  Positive for cough and wheezing. Negative for shortness of breath.    Cardiovascular:  Negative for chest pain, palpitations and leg swelling.   Gastrointestinal:  Negative for abdominal pain, constipation, diarrhea, nausea and vomiting.   Genitourinary:  Negative for dysuria, frequency and urgency.   Musculoskeletal:  Negative for falls.   Skin:  Negative for rash.   Neurological:  Negative for dizziness and headaches.   Endo/Heme/Allergies:  Does not bruise/bleed easily.   Objective:     Physical Exam  Vitals reviewed.   Constitutional:       Appearance: Normal appearance.   HENT:      Head: Normocephalic.   Eyes:      Extraocular Movements: Extraocular movements intact.      Conjunctiva/sclera: Conjunctivae normal.      Pupils: Pupils are equal, round, and reactive to light.   Cardiovascular:      Rate and Rhythm: Normal rate and regular rhythm.   Pulmonary:      Effort: Pulmonary effort is normal.      Breath sounds: Normal breath sounds. No wheezing or rales.   Abdominal:      General: Bowel sounds are normal.   Musculoskeletal:      Right lower leg: No edema.      Left lower leg: No edema.   Skin:     General: Skin is warm.   Neurological:      General: No focal deficit present.      Mental Status: She is alert and oriented to person, place, and time.   Psychiatric:         Mood and Affect: Mood normal.     BP (!) 165/89 (BP Location: Left arm, Patient Position: Sitting, BP Method: Medium (Automatic))   Pulse 82   Ht 5' 4" (1.626 m)   Wt 61 kg (134 lb 6.4 oz)   SpO2 100%   BMI 23.07 kg/m²     Assessment:       ICD-10-CM ICD-9-CM   1. Bronchitis  J40 490       Plan:     Medication List with Changes/Refills   New Medications    AZITHROMYCIN " (Z-MAGNUS) 250 MG TABLET    Take 2 tablets by mouth on day 1; Take 1 tablet by mouth on days 2-5    BENZONATATE (TESSALON) 100 MG CAPSULE    Take 1 capsule (100 mg total) by mouth 3 (three) times daily as needed for Cough.   Current Medications    AMLODIPINE (NORVASC) 10 MG TABLET    Take 1 tablet (10 mg total) by mouth once daily.    ASPIRIN (ECOTRIN) 81 MG EC TABLET    Aspir-81   take 1 tablet daily    ATORVASTATIN (LIPITOR) 40 MG TABLET    Take 40 mg by mouth every evening.    CHOLECALCIFEROL, VITAMIN D3, 125 MCG (5,000 UNIT) CAPSULE    cholecalciferol (vitamin D3) 5,000 unit capsule   TK ONE C PO  QD WITH  A MEAL    CLONIDINE (CATAPRES) 0.1 MG TABLET    TAKE 1 TABLET(0.1 MG) BY MOUTH DAILY AS NEEDED Strength: 0.1 mg    CLOPIDOGREL (PLAVIX) 75 MG TABLET    Take 75 mg by mouth once daily.    ESOMEPRAZOLE (NEXIUM) 40 MG CAPSULE    TAKE 1 CAPSULE(40 MG) BY MOUTH BEFORE BREAKFAST    FERROUS SULFATE (FEOSOL) 325 MG (65 MG IRON) TAB TABLET    ferrous sulfate 325 mg (65 mg iron) tablet  TK 1 T PO QD WITH MEALS    FUROSEMIDE (LASIX) 20 MG TABLET    Take 1 tablet (20 mg total) by mouth once daily.    GABAPENTIN (NEURONTIN) 300 MG CAPSULE    gabapentin 300 mg capsule   TK ONE C PO  QHS    RIVAROXABAN (XARELTO) 2.5 MG TAB    Take 1 tablet (2.5 mg total) by mouth once daily.        1. Bronchitis  -     azithromycin (Z-MAGNUS) 250 MG tablet; Take 2 tablets by mouth on day 1; Take 1 tablet by mouth on days 2-5  Dispense: 6 tablet; Refill: 0  -     benzonatate (TESSALON) 100 MG capsule; Take 1 capsule (100 mg total) by mouth 3 (three) times daily as needed for Cough.  Dispense: 30 capsule; Refill: 0             We don't have the flu swab and patient has refused all vaccines

## 2023-01-20 ENCOUNTER — PATIENT OUTREACH (OUTPATIENT)
Dept: ADMINISTRATIVE | Facility: HOSPITAL | Age: 88
End: 2023-01-20
Payer: MEDICARE

## 2023-01-20 NOTE — PROGRESS NOTES
[facility] Med Rec-Post Hospital discharge 01.18.23    Pt HFU 07/27/22    Post Hospital med rec 07/20/22-08/19/22 not completed by MD

## 2023-02-02 ENCOUNTER — OFFICE VISIT (OUTPATIENT)
Dept: PRIMARY CARE CLINIC | Facility: CLINIC | Age: 88
End: 2023-02-02
Payer: MEDICARE

## 2023-02-02 VITALS
WEIGHT: 137 LBS | HEIGHT: 60 IN | SYSTOLIC BLOOD PRESSURE: 152 MMHG | DIASTOLIC BLOOD PRESSURE: 84 MMHG | BODY MASS INDEX: 26.9 KG/M2 | OXYGEN SATURATION: 99 % | HEART RATE: 84 BPM

## 2023-02-02 DIAGNOSIS — K21.9 GASTROESOPHAGEAL REFLUX DISEASE WITHOUT ESOPHAGITIS: ICD-10-CM

## 2023-02-02 DIAGNOSIS — I10 ESSENTIAL HYPERTENSION: Chronic | ICD-10-CM

## 2023-02-02 DIAGNOSIS — M81.0 AGE-RELATED OSTEOPOROSIS WITHOUT CURRENT PATHOLOGICAL FRACTURE: Primary | ICD-10-CM

## 2023-02-02 DIAGNOSIS — I10 ESSENTIAL HYPERTENSION: ICD-10-CM

## 2023-02-02 PROCEDURE — 1101F PT FALLS ASSESS-DOCD LE1/YR: CPT | Mod: CPTII,S$GLB,, | Performed by: INTERNAL MEDICINE

## 2023-02-02 PROCEDURE — 99214 PR OFFICE/OUTPT VISIT, EST, LEVL IV, 30-39 MIN: ICD-10-PCS | Mod: S$GLB,,, | Performed by: INTERNAL MEDICINE

## 2023-02-02 PROCEDURE — 99214 OFFICE O/P EST MOD 30 MIN: CPT | Mod: S$GLB,,, | Performed by: INTERNAL MEDICINE

## 2023-02-02 PROCEDURE — 3288F PR FALLS RISK ASSESSMENT DOCUMENTED: ICD-10-PCS | Mod: CPTII,S$GLB,, | Performed by: INTERNAL MEDICINE

## 2023-02-02 PROCEDURE — 1101F PR PT FALLS ASSESS DOC 0-1 FALLS W/OUT INJ PAST YR: ICD-10-PCS | Mod: CPTII,S$GLB,, | Performed by: INTERNAL MEDICINE

## 2023-02-02 PROCEDURE — 3288F FALL RISK ASSESSMENT DOCD: CPT | Mod: CPTII,S$GLB,, | Performed by: INTERNAL MEDICINE

## 2023-02-02 RX ORDER — AMLODIPINE BESYLATE 10 MG/1
10 TABLET ORAL DAILY
Qty: 30 TABLET | Refills: 11 | Status: SHIPPED | OUTPATIENT
Start: 2023-02-02 | End: 2023-10-06

## 2023-02-02 RX ORDER — FUROSEMIDE 20 MG/1
20 TABLET ORAL DAILY
Qty: 90 TABLET | Refills: 3 | Status: SHIPPED | OUTPATIENT
Start: 2023-02-02 | End: 2023-11-28

## 2023-02-02 RX ORDER — ESOMEPRAZOLE MAGNESIUM 40 MG/1
40 CAPSULE, DELAYED RELEASE ORAL
Qty: 90 CAPSULE | Refills: 3 | Status: SHIPPED | OUTPATIENT
Start: 2023-02-02 | End: 2023-08-02

## 2023-02-02 RX ORDER — AMLODIPINE BESYLATE 10 MG/1
10 TABLET ORAL DAILY
Qty: 30 TABLET | Refills: 11 | Status: CANCELLED | OUTPATIENT
Start: 2023-02-02 | End: 2024-02-02

## 2023-02-02 RX ORDER — CLONIDINE HYDROCHLORIDE 0.1 MG/1
TABLET ORAL
Qty: 30 TABLET | Refills: 5 | Status: SHIPPED | OUTPATIENT
Start: 2023-02-02 | End: 2023-05-29

## 2023-02-02 NOTE — PROGRESS NOTES
Subjective:      Patient ID: Brenda Perez is a 87 y.o. female.    Chief Complaint: Follow-up    HPI      Had lost 7 lbs in 6 months. No GI symptoms reported. She is starting to regain her weight  Daughter states cilostazol wasn't getting covered by insurance and it is not listed in her medications with the Cardiologist's notes  Her BMI is normal  She sees Dr Tree Cameron for her PVD  Patient was only taking 5 mg of amlodipine but I had increased it to 10 mg which she is now taking.   DEXA done at the bone [facility], patient is not on medications for osteoporosis (I can't find the imaging)         Review of Systems   Constitutional:  Negative for chills and fever.   Respiratory:  Negative for cough, shortness of breath and wheezing.    Cardiovascular:  Negative for chest pain, palpitations and leg swelling.   Gastrointestinal:  Negative for abdominal pain, constipation, diarrhea, nausea and vomiting.   Genitourinary:  Negative for dysuria, frequency and urgency.   Musculoskeletal:  Negative for falls.   Skin:  Negative for rash.   Neurological:  Negative for dizziness and headaches.   Psychiatric/Behavioral:  Negative for depression. The patient is not nervous/anxious.    Objective:     Physical Exam  Vitals reviewed.   Constitutional:       Appearance: Normal appearance.   HENT:      Head: Normocephalic.   Eyes:      Extraocular Movements: Extraocular movements intact.      Conjunctiva/sclera: Conjunctivae normal.      Pupils: Pupils are equal, round, and reactive to light.   Cardiovascular:      Rate and Rhythm: Normal rate and regular rhythm.   Pulmonary:      Effort: Pulmonary effort is normal.      Breath sounds: Normal breath sounds.   Abdominal:      General: Bowel sounds are normal.   Musculoskeletal:      Right lower leg: No edema.      Left lower leg: No edema.   Skin:     General: Skin is warm.      Capillary Refill: Capillary refill takes less than 2 seconds.   Neurological:      General: No focal  deficit present.      Mental Status: She is alert and oriented to person, place, and time.   Psychiatric:         Mood and Affect: Mood normal.     BP (!) 152/84 (BP Location: Left arm, Patient Position: Sitting, BP Method: Medium (Automatic))   Pulse 84   Ht 5' (1.524 m)   Wt 62.1 kg (137 lb)   SpO2 99%   BMI 26.76 kg/m²     Assessment:       ICD-10-CM ICD-9-CM   1. Age-related osteoporosis without current pathological fracture  M81.0 733.01   2. Essential hypertension  I10 401.9   3. Essential hypertension Poorly controlled I10 401.9   4. Gastroesophageal reflux disease without esophagitis  K21.9 530.81       Plan:     Medication List with Changes/Refills   Current Medications    ASPIRIN (ECOTRIN) 81 MG EC TABLET    Aspir-81   take 1 tablet daily    ATORVASTATIN (LIPITOR) 40 MG TABLET    Take 40 mg by mouth every evening.    CHOLECALCIFEROL, VITAMIN D3, 125 MCG (5,000 UNIT) CAPSULE    cholecalciferol (vitamin D3) 5,000 unit capsule   TK ONE C PO  QD WITH  A MEAL    CLOPIDOGREL (PLAVIX) 75 MG TABLET    Take 75 mg by mouth once daily.    FERROUS SULFATE (FEOSOL) 325 MG (65 MG IRON) TAB TABLET    ferrous sulfate 325 mg (65 mg iron) tablet  TK 1 T PO QD WITH MEALS    GABAPENTIN (NEURONTIN) 300 MG CAPSULE    gabapentin 300 mg capsule   TK ONE C PO  QHS    RIVAROXABAN (XARELTO) 2.5 MG TAB    Take 1 tablet (2.5 mg total) by mouth once daily.   Changed and/or Refilled Medications    Modified Medication Previous Medication    AMLODIPINE (NORVASC) 10 MG TABLET amLODIPine (NORVASC) 10 MG tablet       Take 1 tablet (10 mg total) by mouth once daily.    Take 1 tablet (10 mg total) by mouth once daily.    CLONIDINE (CATAPRES) 0.1 MG TABLET cloNIDine (CATAPRES) 0.1 MG tablet       TAKE 1 TABLET(0.1 MG) BY MOUTH DAILY AS NEEDED Strength: 0.1 mg    TAKE 1 TABLET(0.1 MG) BY MOUTH DAILY AS NEEDED Strength: 0.1 mg    ESOMEPRAZOLE (NEXIUM) 40 MG CAPSULE esomeprazole (NEXIUM) 40 MG capsule       Take 1 capsule (40 mg total) by  mouth before breakfast.    TAKE 1 CAPSULE(40 MG) BY MOUTH BEFORE BREAKFAST    FUROSEMIDE (LASIX) 20 MG TABLET furosemide (LASIX) 20 MG tablet       Take 1 tablet (20 mg total) by mouth once daily.    Take 1 tablet (20 mg total) by mouth once daily.        1. Age-related osteoporosis without current pathological fracture  -     DXA Bone Density Appendicular Skeleton; Future; Expected date: 02/06/2023    2. Essential hypertension  -     cloNIDine (CATAPRES) 0.1 MG tablet; TAKE 1 TABLET(0.1 MG) BY MOUTH DAILY AS NEEDED Strength: 0.1 mg  Dispense: 30 tablet; Refill: 5  -     furosemide (LASIX) 20 MG tablet; Take 1 tablet (20 mg total) by mouth once daily.  Dispense: 90 tablet; Refill: 3  -     amLODIPine (NORVASC) 10 MG tablet; Take 1 tablet (10 mg total) by mouth once daily.  Dispense: 30 tablet; Refill: 11    3. Essential hypertension  Comments:  monitor and take clonidine when indicated  Orders:  -     cloNIDine (CATAPRES) 0.1 MG tablet; TAKE 1 TABLET(0.1 MG) BY MOUTH DAILY AS NEEDED Strength: 0.1 mg  Dispense: 30 tablet; Refill: 5  -     furosemide (LASIX) 20 MG tablet; Take 1 tablet (20 mg total) by mouth once daily.  Dispense: 90 tablet; Refill: 3  -     amLODIPine (NORVASC) 10 MG tablet; Take 1 tablet (10 mg total) by mouth once daily.  Dispense: 30 tablet; Refill: 11    4. Gastroesophageal reflux disease without esophagitis  -     esomeprazole (NEXIUM) 40 MG capsule; Take 1 capsule (40 mg total) by mouth before breakfast.  Dispense: 90 capsule; Refill: 3         Refusing vaccinations    Future Appointments   Date Time Provider Department Center   6/2/2023 11:00 AM [facility]

## 2023-03-07 ENCOUNTER — TELEPHONE (OUTPATIENT)
Dept: PRIMARY CARE CLINIC | Facility: CLINIC | Age: 88
End: 2023-03-07
Payer: MEDICARE

## 2023-03-07 NOTE — TELEPHONE ENCOUNTER
----- Message from Vera Martinez LPN sent at 3/7/2023  3:50 PM CST -----  Contact: daughter of pt    ----- Message -----  From: Javier Painting  Sent: 3/7/2023   3:34 PM CST  To: Nirmal Gardner Staff    Patient requesting a call back regarding rivaroxaban (XARELTO) 2.5 mg Tab -- pharmacy stated that they only have higher dosage ( 5 mg and 15 mg). Would like to know if there is another med that could be called out. Please call back at [phone].

## 2023-03-31 ENCOUNTER — PATIENT MESSAGE (OUTPATIENT)
Dept: FAMILY MEDICINE | Facility: CLINIC | Age: 88
End: 2023-03-31
Payer: MEDICARE

## 2023-05-28 DIAGNOSIS — I10 ESSENTIAL HYPERTENSION: Chronic | ICD-10-CM

## 2023-05-29 RX ORDER — CLONIDINE HYDROCHLORIDE 0.1 MG/1
TABLET ORAL
Qty: 30 TABLET | Refills: 5 | Status: SHIPPED | OUTPATIENT
Start: 2023-05-29 | End: 2023-11-13

## 2023-06-02 ENCOUNTER — OFFICE VISIT (OUTPATIENT)
Dept: PRIMARY CARE CLINIC | Facility: CLINIC | Age: 88
End: 2023-06-02
Payer: MEDICARE

## 2023-06-02 VITALS
HEIGHT: 64 IN | HEART RATE: 51 BPM | OXYGEN SATURATION: 99 % | SYSTOLIC BLOOD PRESSURE: 130 MMHG | WEIGHT: 144.38 LBS | DIASTOLIC BLOOD PRESSURE: 80 MMHG | BODY MASS INDEX: 24.65 KG/M2

## 2023-06-02 DIAGNOSIS — K21.9 GASTROESOPHAGEAL REFLUX DISEASE WITHOUT ESOPHAGITIS: ICD-10-CM

## 2023-06-02 DIAGNOSIS — E78.5 HYPERLIPIDEMIA, UNSPECIFIED HYPERLIPIDEMIA TYPE: Primary | ICD-10-CM

## 2023-06-02 DIAGNOSIS — I10 ESSENTIAL HYPERTENSION: ICD-10-CM

## 2023-06-02 DIAGNOSIS — I73.9 PAD (PERIPHERAL ARTERY DISEASE): ICD-10-CM

## 2023-06-02 LAB
CHOLEST SERPL-MSCNC: 195 MG/DL
HDLC SERPL-MCNC: 101 MG/DL
LDLC SERPL CALC-MCNC: 65.4 MG/DL
TRIGL SERPL-MCNC: 143 MG/DL (ref 0–149)
VLDL CHOLESTEROL: 29 MG/DL

## 2023-06-02 PROCEDURE — 3288F FALL RISK ASSESSMENT DOCD: CPT | Mod: CPTII,S$GLB,, | Performed by: INTERNAL MEDICINE

## 2023-06-02 PROCEDURE — 3288F PR FALLS RISK ASSESSMENT DOCUMENTED: ICD-10-PCS | Mod: CPTII,S$GLB,, | Performed by: INTERNAL MEDICINE

## 2023-06-02 PROCEDURE — 1101F PR PT FALLS ASSESS DOC 0-1 FALLS W/OUT INJ PAST YR: ICD-10-PCS | Mod: CPTII,S$GLB,, | Performed by: INTERNAL MEDICINE

## 2023-06-02 PROCEDURE — 99214 OFFICE O/P EST MOD 30 MIN: CPT | Mod: S$GLB,,, | Performed by: INTERNAL MEDICINE

## 2023-06-02 PROCEDURE — 99214 PR OFFICE/OUTPT VISIT, EST, LEVL IV, 30-39 MIN: ICD-10-PCS | Mod: S$GLB,,, | Performed by: INTERNAL MEDICINE

## 2023-06-02 PROCEDURE — 1101F PT FALLS ASSESS-DOCD LE1/YR: CPT | Mod: CPTII,S$GLB,, | Performed by: INTERNAL MEDICINE

## 2023-06-02 NOTE — PROGRESS NOTES
Subjective:      Patient ID: Brenda Perez is a 88 y.o. female.    Chief Complaint: Follow-up    HPI    Past Medical History:   Diagnosis Date    Arthritis     Hyperlipidemia     Hypertension     PVD (peripheral vascular disease)      She sees Dr Tree Cameron for her PVD  Patient was only taking 5 mg of amlodipine but I had increased it to 10 mg which she is now taking.   DEXA done at the bone [facility], patient is not on medications for osteoporosis (I can't find the imaging)  A DEXA was ordered at the last visit, patient hasn't gotten it done yet, number for scheduling provided     No acute complaints today     Review of Systems   Constitutional:  Negative for chills and fever.   HENT:  Negative for hearing loss.    Eyes:  Negative for blurred vision.   Respiratory:  Negative for cough, shortness of breath and wheezing.    Cardiovascular:  Negative for chest pain, palpitations and leg swelling.   Gastrointestinal:  Negative for abdominal pain, blood in stool, constipation, diarrhea, melena, nausea and vomiting.   Genitourinary:  Negative for dysuria, frequency and urgency.   Musculoskeletal:  Negative for falls.   Skin:  Negative for rash.   Neurological:  Negative for dizziness and headaches.   Endo/Heme/Allergies:  Does not bruise/bleed easily.   Psychiatric/Behavioral:  Negative for depression. The patient is not nervous/anxious.    Objective:     Physical Exam  Vitals reviewed.   Constitutional:       Appearance: Normal appearance.   HENT:      Head: Normocephalic.      Mouth/Throat:      Mouth: Mucous membranes are moist.      Pharynx: Oropharynx is clear.   Eyes:      Extraocular Movements: Extraocular movements intact.      Conjunctiva/sclera: Conjunctivae normal.      Pupils: Pupils are equal, round, and reactive to light.   Cardiovascular:      Rate and Rhythm: Normal rate and regular rhythm.   Pulmonary:      Effort: Pulmonary effort is normal.      Breath sounds: Normal breath sounds.   Abdominal:       "General: Bowel sounds are normal.   Musculoskeletal:      Right lower leg: No edema.      Left lower leg: No edema.   Skin:     General: Skin is warm.      Capillary Refill: Capillary refill takes less than 2 seconds.   Neurological:      General: No focal deficit present.      Mental Status: She is alert.   Psychiatric:         Mood and Affect: Mood normal.     /80 (BP Location: Left arm, Patient Position: Sitting, BP Method: Medium (Manual))   Pulse (!) 51   Ht 5' 4" (1.626 m)   Wt 65.5 kg (144 lb 6.4 oz)   SpO2 99%   BMI 24.79 kg/m²     Assessment:       ICD-10-CM ICD-9-CM   1. Hyperlipidemia, unspecified hyperlipidemia type  E78.5 272.4   2. PAD (peripheral artery disease)  I73.9 443.9   3. Essential hypertension  I10 401.9   4. Gastroesophageal reflux disease without esophagitis  K21.9 530.81       Plan:     Medication List with Changes/Refills   Current Medications    AMLODIPINE (NORVASC) 10 MG TABLET    Take 1 tablet (10 mg total) by mouth once daily.    ASPIRIN (ECOTRIN) 81 MG EC TABLET    Aspir-81   take 1 tablet daily    ATORVASTATIN (LIPITOR) 40 MG TABLET    Take 40 mg by mouth every evening.    CHOLECALCIFEROL, VITAMIN D3, 125 MCG (5,000 UNIT) CAPSULE    cholecalciferol (vitamin D3) 5,000 unit capsule   TK ONE C PO  QD WITH  A MEAL    CLONIDINE (CATAPRES) 0.1 MG TABLET    TAKE 1 TABLET BY MOUTH EVERY DAY AS NEEDED    CLOPIDOGREL (PLAVIX) 75 MG TABLET    Take 75 mg by mouth once daily.    ESOMEPRAZOLE (NEXIUM) 40 MG CAPSULE    Take 1 capsule (40 mg total) by mouth before breakfast.    FERROUS SULFATE (FEOSOL) 325 MG (65 MG IRON) TAB TABLET    ferrous sulfate 325 mg (65 mg iron) tablet  TK 1 T PO QD WITH MEALS    FUROSEMIDE (LASIX) 20 MG TABLET    Take 1 tablet (20 mg total) by mouth once daily.    GABAPENTIN (NEURONTIN) 300 MG CAPSULE    gabapentin 300 mg capsule   TK ONE C PO  QHS    RIVAROXABAN (XARELTO) 2.5 MG TAB    Take 1 tablet (2.5 mg total) by mouth once daily.        1. Hyperlipidemia, " unspecified hyperlipidemia type  -     Lipid Panel; Future; Expected date: 06/02/2023    2. PAD (peripheral artery disease)  -     Lipid Panel; Future; Expected date: 06/02/2023    3. Essential hypertension    4. Gastroesophageal reflux disease without esophagitis           Future Appointments   Date Time Provider Department Center   12/4/2023 11:00 AM Kendra Kinney MD [id number]

## 2023-06-05 ENCOUNTER — TELEPHONE (OUTPATIENT)
Dept: PRIMARY CARE CLINIC | Facility: CLINIC | Age: 88
End: 2023-06-05
Payer: MEDICARE

## 2023-06-05 NOTE — TELEPHONE ENCOUNTER
The patient has been advised I am refaxing the order.     ----- Message from Kylee Orozco sent at 6/5/2023  1:01 PM CDT -----  Contact: joan Blackman needs orders for bone density scan pls call [phone] with updated information, Tiff

## 2023-06-18 DIAGNOSIS — M81.0 AGE-RELATED OSTEOPOROSIS WITHOUT CURRENT PATHOLOGICAL FRACTURE: Primary | ICD-10-CM

## 2023-06-18 RX ORDER — IBANDRONATE SODIUM 150 MG/1
150 TABLET, FILM COATED ORAL
Qty: 1 TABLET | Refills: 11 | Status: SHIPPED | OUTPATIENT
Start: 2023-06-18 | End: 2024-06-17

## 2023-10-05 DIAGNOSIS — I10 ESSENTIAL HYPERTENSION: ICD-10-CM

## 2023-10-06 RX ORDER — AMLODIPINE BESYLATE 10 MG/1
10 TABLET ORAL
Qty: 30 TABLET | Refills: 11 | Status: SHIPPED | OUTPATIENT
Start: 2023-10-06

## 2023-11-07 DIAGNOSIS — I10 ESSENTIAL HYPERTENSION: Chronic | ICD-10-CM

## 2023-11-13 RX ORDER — CLONIDINE HYDROCHLORIDE 0.1 MG/1
TABLET ORAL
Qty: 90 TABLET | Refills: 0 | Status: SHIPPED | OUTPATIENT
Start: 2023-11-13 | End: 2024-02-09

## 2023-11-27 DIAGNOSIS — I10 ESSENTIAL HYPERTENSION: Chronic | ICD-10-CM

## 2023-11-28 RX ORDER — FUROSEMIDE 20 MG/1
20 TABLET ORAL
Qty: 90 TABLET | Refills: 3 | Status: SHIPPED | OUTPATIENT
Start: 2023-11-28

## 2023-12-04 ENCOUNTER — OFFICE VISIT (OUTPATIENT)
Dept: PRIMARY CARE CLINIC | Facility: CLINIC | Age: 88
End: 2023-12-04
Payer: MEDICARE

## 2023-12-04 VITALS
HEIGHT: 64 IN | HEART RATE: 69 BPM | BODY MASS INDEX: 25.21 KG/M2 | SYSTOLIC BLOOD PRESSURE: 149 MMHG | DIASTOLIC BLOOD PRESSURE: 75 MMHG | RESPIRATION RATE: 15 BRPM | OXYGEN SATURATION: 98 % | WEIGHT: 147.69 LBS

## 2023-12-04 DIAGNOSIS — I10 ESSENTIAL HYPERTENSION: Primary | ICD-10-CM

## 2023-12-04 DIAGNOSIS — R06.02 SHORTNESS OF BREATH: ICD-10-CM

## 2023-12-04 DIAGNOSIS — Z28.21 IMMUNIZATION REFUSED: ICD-10-CM

## 2023-12-04 PROCEDURE — 1101F PR PT FALLS ASSESS DOC 0-1 FALLS W/OUT INJ PAST YR: ICD-10-PCS | Mod: CPTII,S$GLB,, | Performed by: INTERNAL MEDICINE

## 2023-12-04 PROCEDURE — 99214 PR OFFICE/OUTPT VISIT, EST, LEVL IV, 30-39 MIN: ICD-10-PCS | Mod: S$GLB,,, | Performed by: INTERNAL MEDICINE

## 2023-12-04 PROCEDURE — 1159F MED LIST DOCD IN RCRD: CPT | Mod: CPTII,S$GLB,, | Performed by: INTERNAL MEDICINE

## 2023-12-04 PROCEDURE — 3288F PR FALLS RISK ASSESSMENT DOCUMENTED: ICD-10-PCS | Mod: CPTII,S$GLB,, | Performed by: INTERNAL MEDICINE

## 2023-12-04 PROCEDURE — 3288F FALL RISK ASSESSMENT DOCD: CPT | Mod: CPTII,S$GLB,, | Performed by: INTERNAL MEDICINE

## 2023-12-04 PROCEDURE — 1159F PR MEDICATION LIST DOCUMENTED IN MEDICAL RECORD: ICD-10-PCS | Mod: CPTII,S$GLB,, | Performed by: INTERNAL MEDICINE

## 2023-12-04 PROCEDURE — 1101F PT FALLS ASSESS-DOCD LE1/YR: CPT | Mod: CPTII,S$GLB,, | Performed by: INTERNAL MEDICINE

## 2023-12-04 PROCEDURE — 1126F AMNT PAIN NOTED NONE PRSNT: CPT | Mod: CPTII,S$GLB,, | Performed by: INTERNAL MEDICINE

## 2023-12-04 PROCEDURE — 1126F PR PAIN SEVERITY QUANTIFIED, NO PAIN PRESENT: ICD-10-PCS | Mod: CPTII,S$GLB,, | Performed by: INTERNAL MEDICINE

## 2023-12-04 PROCEDURE — 99214 OFFICE O/P EST MOD 30 MIN: CPT | Mod: S$GLB,,, | Performed by: INTERNAL MEDICINE

## 2023-12-04 NOTE — PROGRESS NOTES
Subjective:      Patient ID: Brenda Perez is a 88 y.o. female.    Chief Complaint: Follow-up (Pt. Here for 6mth F/U.  C/o sob and wheezing x 1 month, pt. States it has stopped now but wants it checked out. )    HPI      She sees Dr Tree Cameron for her PVD  Patient was only taking 5 mg of amlodipine but I had increased it to 10 mg which she is now taking. BP stable    A DEXA was ordered at the last visit, patient has osteoporosis and prescription for boniva was sent but she states she did not get it    She reports some shortness of breath a month ago, she thinks it was because of xarelto but she has been on it for some time. No chest pain reported    She is refusing vaccinations     No current shortness of breath         Review of Systems   Constitutional:  Negative for chills and fever.   HENT:  Negative for hearing loss.    Eyes:  Negative for blurred vision.   Respiratory:  Negative for cough, shortness of breath and wheezing.    Cardiovascular:  Negative for chest pain, palpitations and leg swelling.   Gastrointestinal:  Negative for abdominal pain, blood in stool, constipation, diarrhea, melena, nausea and vomiting.   Genitourinary:  Negative for dysuria, frequency and urgency.   Musculoskeletal:  Negative for falls.   Skin:  Negative for rash.   Neurological:  Negative for dizziness and headaches.   Endo/Heme/Allergies:  Does not bruise/bleed easily.   Psychiatric/Behavioral:  Negative for depression. The patient is not nervous/anxious.      Objective:     Physical Exam  Vitals reviewed.   Constitutional:       Appearance: Normal appearance.   HENT:      Head: Normocephalic.      Mouth/Throat:      Mouth: Mucous membranes are moist.      Pharynx: Oropharynx is clear.   Eyes:      Extraocular Movements: Extraocular movements intact.      Conjunctiva/sclera: Conjunctivae normal.      Pupils: Pupils are equal, round, and reactive to light.   Cardiovascular:      Rate and Rhythm: Normal rate and regular  "rhythm.   Pulmonary:      Effort: Pulmonary effort is normal.      Breath sounds: Normal breath sounds.   Abdominal:      General: Bowel sounds are normal.   Musculoskeletal:      Right lower leg: No edema.      Left lower leg: No edema.   Skin:     General: Skin is warm.      Capillary Refill: Capillary refill takes less than 2 seconds.   Neurological:      General: No focal deficit present.      Mental Status: She is alert.   Psychiatric:         Mood and Affect: Mood normal.       BP (!) 149/75 (BP Location: Left arm, Patient Position: Sitting, BP Method: Medium (Automatic))   Pulse 69   Resp 15   Ht 5' 4" (1.626 m)   Wt 67 kg (147 lb 11.2 oz)   SpO2 98%   BMI 25.35 kg/m²     Assessment:       ICD-10-CM ICD-9-CM   1. Essential hypertension  I10 401.9   2. Shortness of breath  R06.02 786.05   3. Immunization refused  Z28.21 V64.06       Plan:     Medication List with Changes/Refills   Current Medications    AMLODIPINE (NORVASC) 10 MG TABLET    TAKE 1 TABLET(10 MG) BY MOUTH EVERY DAY    ASPIRIN (ECOTRIN) 81 MG EC TABLET    Aspir-81   take 1 tablet daily    ATORVASTATIN (LIPITOR) 40 MG TABLET    Take 40 mg by mouth every evening.    CHOLECALCIFEROL, VITAMIN D3, 125 MCG (5,000 UNIT) CAPSULE    cholecalciferol (vitamin D3) 5,000 unit capsule   TK ONE C PO  QD WITH  A MEAL    CLONIDINE (CATAPRES) 0.1 MG TABLET    TAKE 1 TABLET BY MOUTH EVERY DAY AS NEEDED    CLOPIDOGREL (PLAVIX) 75 MG TABLET    Take 75 mg by mouth once daily.    ESOMEPRAZOLE (NEXIUM) 40 MG CAPSULE    TAKE 1 CAPSULE(40 MG) BY MOUTH BEFORE BREAKFAST    FERROUS SULFATE (FEOSOL) 325 MG (65 MG IRON) TAB TABLET    ferrous sulfate 325 mg (65 mg iron) tablet  TK 1 T PO QD WITH MEALS    FUROSEMIDE (LASIX) 20 MG TABLET    TAKE 1 TABLET(20 MG) BY MOUTH EVERY DAY    GABAPENTIN (NEURONTIN) 300 MG CAPSULE    gabapentin 300 mg capsule   TK ONE C PO  QHS    IBANDRONATE (BONIVA) 150 MG TABLET    Take 1 tablet (150 mg total) by mouth every 30 days.    RIVAROXABAN " (XARELTO) 2.5 MG TAB    Take 1 tablet (2.5 mg total) by mouth once daily.        1. Essential hypertension  -     CBC Auto Differential; Future; Expected date: 12/04/2023  -     Comprehensive Metabolic Panel; Future; Expected date: 12/04/2023    2. Shortness of breath  -     EKG 12-lead; Future  -     X-Ray Chest PA And Lateral; Future; Expected date: 12/04/2023    3. Immunization refused       Will call pharmacy to inquire about boniva. She does not have her natural teeth       Future Appointments   Date Time Provider Department Center   6/4/2024 11:00 AM Kendra Kinney MD [id number]

## 2023-12-14 ENCOUNTER — TELEPHONE (OUTPATIENT)
Dept: PRIMARY CARE CLINIC | Facility: CLINIC | Age: 88
End: 2023-12-14
Payer: MEDICARE

## 2023-12-15 ENCOUNTER — OFFICE VISIT (OUTPATIENT)
Dept: PRIMARY CARE CLINIC | Facility: CLINIC | Age: 88
End: 2023-12-15
Payer: MEDICARE

## 2023-12-15 VITALS
HEART RATE: 68 BPM | HEIGHT: 64 IN | BODY MASS INDEX: 24.5 KG/M2 | TEMPERATURE: 98 F | DIASTOLIC BLOOD PRESSURE: 78 MMHG | RESPIRATION RATE: 15 BRPM | WEIGHT: 143.5 LBS | OXYGEN SATURATION: 98 % | SYSTOLIC BLOOD PRESSURE: 177 MMHG
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
## 2023-12-15 DIAGNOSIS — K86.89 MASS OF PANCREAS: Primary | ICD-10-CM

## 2023-12-15 PROCEDURE — 1159F MED LIST DOCD IN RCRD: CPT | Mod: CPTII,S$GLB,, | Performed by: INTERNAL MEDICINE

## 2023-12-15 PROCEDURE — 99212 OFFICE O/P EST SF 10 MIN: CPT | Mod: S$GLB,,, | Performed by: INTERNAL MEDICINE

## 2023-12-15 PROCEDURE — 1159F PR MEDICATION LIST DOCUMENTED IN MEDICAL RECORD: ICD-10-PCS | Mod: CPTII,S$GLB,, | Performed by: INTERNAL MEDICINE

## 2023-12-15 PROCEDURE — 3288F PR FALLS RISK ASSESSMENT DOCUMENTED: ICD-10-PCS | Mod: CPTII,S$GLB,, | Performed by: INTERNAL MEDICINE

## 2023-12-15 PROCEDURE — 1125F AMNT PAIN NOTED PAIN PRSNT: CPT | Mod: CPTII,S$GLB,, | Performed by: INTERNAL MEDICINE

## 2023-12-15 PROCEDURE — 1125F PR PAIN SEVERITY QUANTIFIED, PAIN PRESENT: ICD-10-PCS | Mod: CPTII,S$GLB,, | Performed by: INTERNAL MEDICINE

## 2023-12-15 PROCEDURE — 1101F PT FALLS ASSESS-DOCD LE1/YR: CPT | Mod: CPTII,S$GLB,, | Performed by: INTERNAL MEDICINE

## 2023-12-15 PROCEDURE — 3288F FALL RISK ASSESSMENT DOCD: CPT | Mod: CPTII,S$GLB,, | Performed by: INTERNAL MEDICINE

## 2023-12-15 PROCEDURE — 1101F PR PT FALLS ASSESS DOC 0-1 FALLS W/OUT INJ PAST YR: ICD-10-PCS | Mod: CPTII,S$GLB,, | Performed by: INTERNAL MEDICINE

## 2023-12-15 PROCEDURE — 99212 PR OFFICE/OUTPT VISIT, EST, LEVL II, 10-19 MIN: ICD-10-PCS | Mod: S$GLB,,, | Performed by: INTERNAL MEDICINE

## 2023-12-15 RX ORDER — ACETAMINOPHEN 500 MG
TABLET ORAL
COMMUNITY

## 2023-12-15 RX ORDER — TRAMADOL HYDROCHLORIDE 50 MG/1
50 TABLET ORAL EVERY 6 HOURS PRN
COMMUNITY
Start: 2023-12-06
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
## 2024-02-09 DIAGNOSIS — I10 ESSENTIAL HYPERTENSION: Chronic | ICD-10-CM

## 2024-02-09 RX ORDER — CLONIDINE HYDROCHLORIDE 0.1 MG/1
TABLET ORAL
Qty: 90 TABLET | Refills: 0 | Status: SHIPPED | OUTPATIENT
Start: 2024-02-09 | End: 2024-03-14

## 2024-03-14 DIAGNOSIS — I10 ESSENTIAL HYPERTENSION: Chronic | ICD-10-CM

## 2024-03-14 RX ORDER — CLONIDINE HYDROCHLORIDE 0.1 MG/1
TABLET ORAL
Qty: 90 TABLET | Refills: 0 | Status: SHIPPED | OUTPATIENT
Start: 2024-03-14 | End: 2024-06-04 | Stop reason: SDUPTHER

## 2024-06-04 ENCOUNTER — CLINICAL SUPPORT (OUTPATIENT)
Dept: OBSTETRICS AND GYNECOLOGY | Facility: CLINIC | Age: 89
End: 2024-06-04
Payer: MEDICARE

## 2024-06-04 ENCOUNTER — OFFICE VISIT (OUTPATIENT)
Dept: PRIMARY CARE CLINIC | Facility: CLINIC | Age: 89
End: 2024-06-04
Payer: MEDICARE

## 2024-06-04 VITALS
BODY MASS INDEX: 25.04 KG/M2 | WEIGHT: 146.69 LBS | DIASTOLIC BLOOD PRESSURE: 74 MMHG | OXYGEN SATURATION: 96 % | HEART RATE: 68 BPM | SYSTOLIC BLOOD PRESSURE: 139 MMHG | HEIGHT: 64 IN

## 2024-06-04 DIAGNOSIS — K86.89 MASS OF PANCREAS: Primary | ICD-10-CM

## 2024-06-04 DIAGNOSIS — Z01.89 ROUTINE LAB DRAW: Primary | ICD-10-CM

## 2024-06-04 DIAGNOSIS — I25.119 ATHEROSCLEROSIS OF NATIVE CORONARY ARTERY OF NATIVE HEART WITH ANGINA PECTORIS: ICD-10-CM

## 2024-06-04 DIAGNOSIS — I73.9 PAD (PERIPHERAL ARTERY DISEASE): ICD-10-CM

## 2024-06-04 DIAGNOSIS — I10 ESSENTIAL HYPERTENSION: Chronic | ICD-10-CM

## 2024-06-04 LAB
ABS NRBC COUNT: 0 THOU/UL (ref 0–0.01)
ABSOLUTE BASOPHIL: 0 10*3/UL (ref 0–0.3)
ABSOLUTE EOSINOPHIL: 0.1 10*3/UL (ref 0–0.6)
ABSOLUTE IMMATURE GRAN: 0.03 THOU/UL (ref 0–0.03)
ABSOLUTE LYMPHOCYTE: 2.7 10*3/UL (ref 1.2–4)
ABSOLUTE MONOCYTE: 0.7 10*3/UL (ref 0.1–0.8)
ALBUMIN SERPL BCP-MCNC: 3.9 G/DL (ref 3.4–5)
ALP SERPL-CCNC: 55 U/L (ref 45–117)
ALT SERPL W P-5'-P-CCNC: 21 U/L (ref 13–56)
ANION GAP SERPL CALC-SCNC: 7 MMOL/L (ref 3–11)
AST SERPL-CCNC: 17 U/L (ref 15–37)
BASOPHILS NFR BLD: 0.6 % (ref 0–3)
BILIRUB SERPL-MCNC: 0.4 MG/DL (ref 0.2–1)
BUN SERPL-MCNC: 24 MG/DL (ref 7–18)
BUN/CREAT SERPL: 20.51 RATIO
CALCIUM SERPL-MCNC: 9 MG/DL (ref 8.5–10.1)
CHLORIDE SERPL-SCNC: 106 MMOL/L (ref 98–107)
CHOLEST SERPL-MSCNC: 249 MG/DL
CO2 SERPL-SCNC: 27 MMOL/L (ref 21–32)
CREAT SERPL-MCNC: 1.17 MG/DL (ref 0.55–1.02)
EOSINOPHIL NFR BLD: 1.4 % (ref 0–6)
ERYTHROCYTE [DISTWIDTH] IN BLOOD BY AUTOMATED COUNT: 16.1 % (ref 0–15.5)
GFR ESTIMATION: 53
GLUCOSE SERPL-MCNC: 80 MG/DL (ref 74–106)
HCT VFR BLD AUTO: 40.4 % (ref 37–47)
HDLC SERPL-MCNC: 109 MG/DL
HGB BLD-MCNC: 12.5 G/DL (ref 12–16)
IMMATURE GRANULOCYTES: 0.4 % (ref 0–0.43)
LDLC SERPL CALC-MCNC: 120.2 MG/DL
LYMPHOCYTES NFR BLD: 37.5 % (ref 20–45)
MCH RBC QN AUTO: 22.4 PG (ref 27–32)
MCHC RBC AUTO-ENTMCNC: 30.9 % (ref 32–36)
MCV RBC AUTO: 72.5 FL (ref 80–99)
MONOCYTES NFR BLD: 9.3 % (ref 2–10)
NEUTROPHILS # BLD AUTO: 3.6 10*3/UL (ref 1.4–7)
NEUTROPHILS NFR BLD: 50.8 % (ref 50–80)
NUCLEATED RED BLOOD CELLS: 0 % (ref 0–0.2)
PLATELETS: 298 10*3/UL (ref 130–400)
PMV BLD AUTO: 9.6 FL (ref 9.2–12.2)
POTASSIUM SERPL-SCNC: 4 MMOL/L (ref 3.5–5.1)
PROT SERPL-MCNC: 7.6 G/DL (ref 6.4–8.2)
RBC # BLD AUTO: 5.57 10*6/UL (ref 4.2–5.4)
SODIUM BLD-SCNC: 140 MMOL/L (ref 131–143)
TRIGL SERPL-MCNC: 99 MG/DL (ref 0–149)
VLDL CHOLESTEROL: 20 MG/DL
WBC # BLD: 7.1 10*3/UL (ref 4.5–10)

## 2024-06-04 PROCEDURE — 1101F PT FALLS ASSESS-DOCD LE1/YR: CPT | Mod: CPTII,S$GLB,, | Performed by: INTERNAL MEDICINE

## 2024-06-04 PROCEDURE — 36415 COLL VENOUS BLD VENIPUNCTURE: CPT | Mod: S$GLB,,, | Performed by: INTERNAL MEDICINE

## 2024-06-04 PROCEDURE — 3288F FALL RISK ASSESSMENT DOCD: CPT | Mod: CPTII,S$GLB,, | Performed by: INTERNAL MEDICINE

## 2024-06-04 PROCEDURE — 99214 OFFICE O/P EST MOD 30 MIN: CPT | Mod: S$GLB,,, | Performed by: INTERNAL MEDICINE

## 2024-06-04 PROCEDURE — 1159F MED LIST DOCD IN RCRD: CPT | Mod: CPTII,S$GLB,, | Performed by: INTERNAL MEDICINE

## 2024-06-04 RX ORDER — ASPIRIN 81 MG/1
81 TABLET ORAL DAILY
Qty: 90 TABLET | Refills: 3 | Status: SHIPPED | OUTPATIENT
Start: 2024-06-04

## 2024-06-04 RX ORDER — CLONIDINE HYDROCHLORIDE 0.1 MG/1
TABLET ORAL
Qty: 90 TABLET | Refills: 0 | Status: SHIPPED | OUTPATIENT
Start: 2024-06-04 | End: 2024-06-05
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
## 2024-06-05 DIAGNOSIS — I10 ESSENTIAL HYPERTENSION: ICD-10-CM

## 2024-06-05 RX ORDER — CLONIDINE HYDROCHLORIDE 0.1 MG/1
TABLET ORAL
Qty: 90 TABLET | Refills: 0 | Status: SHIPPED | OUTPATIENT
Start: 2024-06-05

## 2024-06-19 ENCOUNTER — TELEPHONE (OUTPATIENT)
Dept: PRIMARY CARE CLINIC | Facility: CLINIC | Age: 89
End: 2024-06-19
Payer: MEDICARE

## 2024-06-19 NOTE — TELEPHONE ENCOUNTER
----- Message from Lyn Santizo sent at 6/19/2024  2:12 PM CDT -----  Contact: Leslie Bardales  Type: Staff Message     Who called: Leslie Bardales  Call back number: [phone]  Reason for the call: patient declined eus - pt says she feel good and does not want to come to [place]  Additional information: n/a

## 2024-08-10 DIAGNOSIS — M81.0 AGE-RELATED OSTEOPOROSIS WITHOUT CURRENT PATHOLOGICAL FRACTURE: ICD-10-CM

## 2024-08-13 DIAGNOSIS — K21.9 GASTROESOPHAGEAL REFLUX DISEASE WITHOUT ESOPHAGITIS: ICD-10-CM

## 2024-08-13 RX ORDER — IBANDRONATE SODIUM 150 MG/1
TABLET, FILM COATED ORAL
Qty: 1 TABLET | Refills: 11 | Status: SHIPPED | OUTPATIENT
Start: 2024-08-13

## 2024-08-13 RX ORDER — ESOMEPRAZOLE MAGNESIUM 40 MG/1
40 CAPSULE, DELAYED RELEASE ORAL
Qty: 90 CAPSULE | Refills: 3 | Status: SHIPPED | OUTPATIENT
Start: 2024-08-13

## 2024-09-05 DIAGNOSIS — I10 ESSENTIAL HYPERTENSION: ICD-10-CM

## 2024-09-05 NOTE — TELEPHONE ENCOUNTER
----- Message from Katty Cisse sent at 9/5/2024  2:56 PM CDT -----  Type:  RX Refill Request    Who Called:  patient  Refill or New Rx: refill  RX Name and Strength:  cloNIDine (CATAPRES) 0.1 MG tablet  How is the patient currently taking it? (ex. 1XDay):  1X Day  Is this a 30 day or 90 day RX:  30 Day  Preferred Pharmacy with phone number:           [id number] - [place], [place] - [address] [place] [address] [address] [facility] [address]  Phone: [phone] Fax: [phone]      Local or Mail Order:  Local  Ordering Provider:  Dr Kendra Kinney  Would the patient rather a call back or a response via [facility]? Call back  Best Call Back Number: [phone] or [phone]    Additional Information:  completely out no refills

## 2024-09-06 DIAGNOSIS — M81.0 AGE-RELATED OSTEOPOROSIS WITHOUT CURRENT PATHOLOGICAL FRACTURE: ICD-10-CM

## 2024-09-06 RX ORDER — CLONIDINE HYDROCHLORIDE 0.1 MG/1
TABLET ORAL
Qty: 90 TABLET | Refills: 0 | Status: SHIPPED | OUTPATIENT
Start: 2024-09-06

## 2024-09-09 DIAGNOSIS — M81.0 AGE-RELATED OSTEOPOROSIS WITHOUT CURRENT PATHOLOGICAL FRACTURE: ICD-10-CM

## 2024-09-09 RX ORDER — IBANDRONATE SODIUM 150 MG/1
TABLET, FILM COATED ORAL
Qty: 1 TABLET | Refills: 11 | Status: SHIPPED | OUTPATIENT
Start: 2024-09-09 | End: 2024-09-09 | Stop reason: SDUPTHER

## 2024-09-09 NOTE — TELEPHONE ENCOUNTER
----- Message from Paula Elaine sent at 9/9/2024  3:23 PM CDT -----  Contact: Romina/Daughter  Type:  RX Refill Request    Who Called: Romina  Refill or New Rx:refill  RX Name and Strength:ibandronate (BONIVA) 150 mg tablet   How is the patient currently taking it? (ex. 1XDay):as prescribed  Is this a 30 day or 90 day RX:90  Preferred Pharmacy with phone number:  [id number] - [place], [place] - [facility] [address] [facility] [address] [facility] [address]  Phone: [phone] Fax: [phone]  Local or Mail Order:local  Ordering Provider:Dr. Kinney  Would the patient rather a call back or a response via [facility]? call  Best Call Back Number:[phone]  Additional Information: Patient's daughter request to receive a doctor's approval to receive refill of patient's prescription. Please give Ms. Vanegas a call back to notify when sent.   Thank you,  GH

## 2024-09-11 RX ORDER — IBANDRONATE SODIUM 150 MG/1
150 TABLET, FILM COATED ORAL
Qty: 6 TABLET | Refills: 0 | Status: SHIPPED | OUTPATIENT
Start: 2024-09-11

## 2024-12-03 ENCOUNTER — TELEPHONE (OUTPATIENT)
Dept: PRIMARY CARE CLINIC | Facility: CLINIC | Age: 89
End: 2024-12-03
Payer: MEDICARE

## 2024-12-04 ENCOUNTER — TELEPHONE (OUTPATIENT)
Dept: GASTROENTEROLOGY | Facility: CLINIC | Age: 89
End: 2024-12-04
Payer: MEDICARE

## 2024-12-04 ENCOUNTER — OFFICE VISIT (OUTPATIENT)
Dept: PRIMARY CARE CLINIC | Facility: CLINIC | Age: 89
End: 2024-12-04
Payer: MEDICARE

## 2024-12-04 VITALS
DIASTOLIC BLOOD PRESSURE: 78 MMHG | OXYGEN SATURATION: 98 % | HEART RATE: 80 BPM | SYSTOLIC BLOOD PRESSURE: 163 MMHG | WEIGHT: 144.81 LBS | HEIGHT: 64 IN | BODY MASS INDEX: 24.72 KG/M2

## 2024-12-04 DIAGNOSIS — I10 ESSENTIAL HYPERTENSION: ICD-10-CM

## 2024-12-04 DIAGNOSIS — M81.0 AGE-RELATED OSTEOPOROSIS WITHOUT CURRENT PATHOLOGICAL FRACTURE: ICD-10-CM

## 2024-12-04 DIAGNOSIS — I73.9 PAD (PERIPHERAL ARTERY DISEASE): Primary | ICD-10-CM

## 2024-12-04 DIAGNOSIS — K86.89 MASS OF PANCREAS: ICD-10-CM

## 2024-12-04 PROCEDURE — 99214 OFFICE O/P EST MOD 30 MIN: CPT | Mod: S$PBB,,, | Performed by: INTERNAL MEDICINE

## 2024-12-04 PROCEDURE — 1159F MED LIST DOCD IN RCRD: CPT | Mod: CPTII,,, | Performed by: INTERNAL MEDICINE

## 2024-12-04 PROCEDURE — 1101F PT FALLS ASSESS-DOCD LE1/YR: CPT | Mod: CPTII,,, | Performed by: INTERNAL MEDICINE

## 2024-12-04 PROCEDURE — 3288F FALL RISK ASSESSMENT DOCD: CPT | Mod: CPTII,,, | Performed by: INTERNAL MEDICINE

## 2024-12-04 RX ORDER — IBANDRONATE SODIUM 150 MG/1
150 TABLET, FILM COATED ORAL
Qty: 6 TABLET | Refills: 0 | Status: SHIPPED | OUTPATIENT
Start: 2024-12-04

## 2024-12-04 RX ORDER — CLONIDINE HYDROCHLORIDE 0.1 MG/1
TABLET ORAL
Qty: 90 TABLET | Refills: 0 | Status: SHIPPED | OUTPATIENT
Start: 2024-12-04

## 2024-12-04 RX ORDER — CILOSTAZOL 50 MG/1
50 TABLET ORAL 2 TIMES DAILY
Status: CANCELLED | OUTPATIENT
Start: 2024-12-04

## 2024-12-04 RX ORDER — ATORVASTATIN CALCIUM 40 MG/1
40 TABLET, FILM COATED ORAL NIGHTLY
Qty: 90 TABLET | Refills: 3 | Status: SHIPPED | OUTPATIENT
Start: 2024-12-04

## 2024-12-04 RX ORDER — CILOSTAZOL 50 MG/1
1 TABLET ORAL 2 TIMES DAILY
COMMUNITY
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
## 2025-01-08 NOTE — PRE-PROCEDURE INSTRUCTIONS
On 1/7/25 pt's hx and med list was reviewed. Pt informed me that she is so no longer on Xarelto. Left message with Dr. Bardales's nurse (Viviana) regarding Xarelto and holding instructions for other blood thinners. (CRA unclear). On 1/8/25 left another message with Viviana regarding same issue. Waiting on response.

## 2025-01-10 ENCOUNTER — ANESTHESIA EVENT (OUTPATIENT)
Dept: SURGERY | Facility: HOSPITAL | Age: 89
End: 2025-01-10
Payer: MEDICARE

## 2025-01-10 NOTE — CLINICAL REVIEW
Plavix last dose 1/16/25. Xarelto last dose 1/18/25. no recent preop testing. cardiology notes utd. CRA provided. chart review complete. ccv

## 2025-01-22 ENCOUNTER — ANESTHESIA (OUTPATIENT)
Dept: SURGERY | Facility: HOSPITAL | Age: 89
End: 2025-01-22
Payer: MEDICARE

## 2025-02-03 DIAGNOSIS — I10 ESSENTIAL HYPERTENSION: ICD-10-CM

## 2025-02-03 NOTE — TELEPHONE ENCOUNTER
----- Message from Katty sent at 2/3/2025  2:12 PM CST -----  Patient is calling for prior authorization for medication amLODIPine (NORVASC) 10 MG tablet and cilostazoL (PLETAL) 50 MG Tab  called into pharmacy.  Please call her back at [phone]

## 2025-02-04 RX ORDER — AMLODIPINE BESYLATE 10 MG/1
10 TABLET ORAL DAILY
Qty: 30 TABLET | Refills: 11 | Status: SHIPPED | OUTPATIENT
Start: 2025-02-04

## 2025-02-04 RX ORDER — CILOSTAZOL 50 MG/1
50 TABLET ORAL 2 TIMES DAILY
OUTPATIENT
Start: 2025-02-04

## 2025-02-18 NOTE — PRE-PROCEDURE INSTRUCTIONS
[facility]: Outpatient Surgery   Preprocedure Check-In Instructions       Your arrival time for your surgery or procedure is 7:30am.     We ask patients to arrive about 2 hours before surgery to allow for enough time to review your health history & medications, start your IV, complete any outstanding labwork or tests, and meet your Anesthesiologist.     You will arrive at [facility], [address] [place] [address]. Enter through the [place] entrance next to the Emergency Room, and come to the 6th floor to the Outpatient Surgery Department. If you need a wheelchair, please call [phone] for an attendant to meet you at the [place] entrance with a wheelchair.    Wait Times:  Due to inconsistent procedure completion times, an unexpected wait may occur.  The physicians would like you to be here in the event they run ahead of time.  We will keep you comfortable and informed while you are waiting.  We apologize in advance if this happens.    Visitory Policy:   You are allowed 2 adult visitors to be with you in the hospital. All hospital visitors should be in good current health. No small children.     What to Bring:   Please have your ID, insurance cards, and all home medication bottles with you at check in. Bring your CPAP machine if one is used at home.     Fasting:   Nothing to eat or drink after midnight the night before your procedure. This includes no ice, gum, hard candies, and/or tobacco products.   Follow your doctor's instructions for taking any medications on the morning of your procedure. If no instructions for taking medications were given, do not take any medications but bring your medications in their bottles to your procedure check in.     Follow your doctor's preoperative instructions regarding skin prep, bowel prep, bathing, or showering prior to your procedure. If any special soaps were provided to you, please use according to your doctor's instructions.  If no instructions were given from your doctor, take a good bath or shower with antibacterial soap the night before and the morning of your procedure. On the morning of procedure, wear loose, comfortable clothing. No lotions, makeup, perfumes, colognes, deodorant, or jewelry to your procedure. Removable items (glasses, contact lenses, dentures, retainers, hearing aids) need to be removed for your procedure. Bring your storage containers for these items if you wear them.     Artificial nails, body jewelry, eyelash extensions, and/or hair extensions with metal clips are not allowed during your surgery. If you currently wear any of these items, please arrange for them to be removed prior to your arrival to the hospital.     Outpatient or Same Day Surgeries:   Any patients receiving sedation/anesthesia are advised not to drive for 24 hours after their procedure. We do not allow patients to drive themselves home after discharge. If you are going home after your procedure, please have someone available to drive you home from the hospital.     You may call the Outpatient Surgery Department at [phone] with any questions or concerns. We are looking forward to meeting you and taking great care of you for your procedure. Thank you for choosing [facility] for your surgical needs.       Status: complete  Spoke with: patient's jaydonmunira  Call Time: [phone]

## 2025-02-19 ENCOUNTER — HOSPITAL ENCOUNTER (OUTPATIENT)
Facility: HOSPITAL | Age: 89
Discharge: HOME | End: 2025-02-19
Attending: INTERNAL MEDICINE | Admitting: INTERNAL MEDICINE
Payer: MEDICARE

## 2025-02-19 DIAGNOSIS — K86.89 PANCREATIC MASS: ICD-10-CM

## 2025-02-19 DIAGNOSIS — K86.2 CYSTIC MASS OF PANCREAS: ICD-10-CM

## 2025-02-19 PROCEDURE — 27201423 OPTIME MED/SURG SUP & DEVICES STERILE SUPPLY: Performed by: INTERNAL MEDICINE

## 2025-02-19 PROCEDURE — 88305 TISSUE EXAM BY PATHOLOGIST: CPT | Performed by: INTERNAL MEDICINE

## 2025-02-19 PROCEDURE — 43239 EGD BIOPSY SINGLE/MULTIPLE: CPT | Mod: 59 | Performed by: INTERNAL MEDICINE

## 2025-02-19 PROCEDURE — 43242 EGD US FINE NEEDLE BX/ASPIR: CPT | Performed by: INTERNAL MEDICINE

## 2025-02-19 PROCEDURE — 88108 CYTOPATH CONCENTRATE TECH: CPT

## 2025-02-19 PROCEDURE — 37000008 HC ANESTHESIA 1ST 15 MINUTES: Performed by: INTERNAL MEDICINE

## 2025-02-19 PROCEDURE — 37000009 HC ANESTHESIA EA ADD 15 MINS: Performed by: INTERNAL MEDICINE

## 2025-02-19 PROCEDURE — 88313 SPECIAL STAINS GROUP 2: CPT

## 2025-02-19 PROCEDURE — 25000003 PHARM REV CODE 250: Performed by: STUDENT IN AN ORGANIZED HEALTH CARE EDUCATION/TRAINING PROGRAM

## 2025-02-19 PROCEDURE — 63600175 PHARM REV CODE 636 W HCPCS: Performed by: STUDENT IN AN ORGANIZED HEALTH CARE EDUCATION/TRAINING PROGRAM

## 2025-02-19 RX ORDER — FENTANYL CITRATE 50 UG/ML
INJECTION, SOLUTION INTRAMUSCULAR; INTRAVENOUS
Status: DISCONTINUED | OUTPATIENT
Start: 2025-02-19 | End: 2025-02-19

## 2025-02-19 RX ORDER — GLYCOPYRROLATE 0.2 MG/ML
INJECTION INTRAMUSCULAR; INTRAVENOUS
Status: DISCONTINUED | OUTPATIENT
Start: 2025-02-19 | End: 2025-02-19

## 2025-02-19 RX ORDER — PROPOFOL 10 MG/ML
INJECTION, EMULSION INTRAVENOUS CONTINUOUS PRN
Status: DISCONTINUED | OUTPATIENT
Start: 2025-02-19 | End: 2025-02-19

## 2025-02-19 RX ORDER — CEFTRIAXONE 250 MG/1
INJECTION, POWDER, FOR SOLUTION INTRAMUSCULAR; INTRAVENOUS
Status: DISCONTINUED | OUTPATIENT
Start: 2025-02-19 | End: 2025-02-19

## 2025-02-19 RX ORDER — LIDOCAINE HYDROCHLORIDE 20 MG/ML
INJECTION INTRAVENOUS
Status: DISCONTINUED | OUTPATIENT
Start: 2025-02-19 | End: 2025-02-19

## 2025-02-19 RX ORDER — PROPOFOL 10 MG/ML
INJECTION, EMULSION INTRAVENOUS
Status: DISCONTINUED | OUTPATIENT
Start: 2025-02-19 | End: 2025-02-19

## 2025-02-19 RX ADMIN — FENTANYL CITRATE 25 MCG: 50 INJECTION, SOLUTION INTRAMUSCULAR; INTRAVENOUS at 09:02

## 2025-02-19 RX ADMIN — LIDOCAINE HYDROCHLORIDE 80 MG: 20 INJECTION INTRAVENOUS at 08:02

## 2025-02-19 RX ADMIN — FENTANYL CITRATE 25 MCG: 50 INJECTION, SOLUTION INTRAMUSCULAR; INTRAVENOUS at 08:02

## 2025-02-19 RX ADMIN — CEFTRIAXONE SODIUM 1 G: 250 INJECTION, POWDER, FOR SOLUTION INTRAMUSCULAR; INTRAVENOUS at 09:02

## 2025-02-19 RX ADMIN — GLYCOPYRROLATE 0.2 MG: 0.2 INJECTION INTRAMUSCULAR; INTRAVENOUS at 08:02

## 2025-02-19 RX ADMIN — FENTANYL CITRATE 50 MCG: 50 INJECTION, SOLUTION INTRAMUSCULAR; INTRAVENOUS at 09:02

## 2025-02-19 RX ADMIN — PROPOFOL 50 MCG/KG/MIN: 10 INJECTION, EMULSION INTRAVENOUS at 08:02

## 2025-02-19 RX ADMIN — SODIUM CHLORIDE: 9 INJECTION, SOLUTION INTRAVENOUS at 08:02

## 2025-02-19 NOTE — H&P
Endoscopy History and Physical    PCP - Kendra Kinney MD    Procedure - UMAGNOS  ASA & Mallampati - per anesthesia      HPI:  This is a 89 y.o. female here for evaluation of a large TOP lesion on CT scan; unclear if this is solid or cystic in nature.       ROS:  Constitutional: No fevers, chills, No weight loss  ENT: No allergies  CV: No chest pain  Pulm: No shortness of breath  GI: see HPI  Derm: No rash    Medical History:  has a past medical history of Arthritis, CAD (coronary artery disease), Claudication, Coronary stent restenosis, Hyperlipidemia, Hypertension, OA (osteoarthritis), Osteoporosis, Pancreatic mass, and PVD (peripheral vascular disease).    Surgical History:  has a past surgical history that includes Femoral artery stent (Bilateral, 03/17/2020); Finger fracture surgery (Right); GALL STONES REMOVED; Cataract extraction, bilateral; Atherectomy; Colonoscopy; revascularization (Left); and Thrombectomy.    Family History: family history is not on file.     Social History:  reports that she has never smoked. She has never been exposed to tobacco smoke. She has never used smokeless tobacco. She reports that she does not currently use alcohol. She reports that she does not currently use drugs.    Review of patient's allergies indicates:   Allergen Reactions    Hydrocodone Rash    Tenex (ppa)        Medications:   Prescriptions Prior to Admission[1]      Objective Findings:    Vital Signs: see nursing notes  Physical Exam:  General Appearance:Elderly female, well appearing in no acute distress  Neuro: A&O x 3, no focal deficits  Eyes:    No scleral icterus  ENT: Neck supple  Lungs: CTA anteriorly  Heart:  S1, S2 normal, no murmurs heard  Abdomen: Soft, non tender, non distended with positive bowel sounds. No hepatosplenomegaly, ascites, or mass  Extremities: no edema  Skin: No rash      Labs:  Lab Results   Component Value Date    WBC 7.1 06/04/2024    HGB 12.5 06/04/2024    HCT 40.4 06/04/2024      11/03/2022    CHOL 249 (H) 06/04/2024    TRIG 99 06/04/2024     06/04/2024    ALT 21 06/04/2024    AST 17 06/04/2024     06/04/2024    K 4.0 06/04/2024     06/04/2024    CREATININE 1.17 (H) 06/04/2024    BUN 24.0 (H) 06/04/2024    CO2 27 06/04/2024       I have explained the risks and benefits of endoscopy procedures to the patient including but not limited to bleeding, perforation, infection, and death.    Ketan Bardales MD         [1]   Medications Prior to Admission   Medication Sig Dispense Refill Last Dose/Taking    aspirin (ECOTRIN) 81 MG EC tablet Take 1 tablet (81 mg total) by mouth once daily. 90 tablet 3 Taking    atorvastatin (LIPITOR) 40 MG tablet Take 1 tablet (40 mg total) by mouth every evening. 90 tablet 3 Taking    cholecalciferol, vitamin D3, 125 mcg (5,000 unit) Tab Vitamin D3 125 mcg (5,000 unit) tablet   Take 1 tablet every day by oral route with meals.   Taking    cilostazoL (PLETAL) 50 MG Tab Take 1 tablet by mouth 2 (two) times daily.   Taking    cloNIDine (CATAPRES) 0.1 MG tablet TAKE 1 TABLET BY MOUTH EVERY DAY AS NEEDED 90 tablet 0 Taking    clopidogreL (PLAVIX) 75 mg tablet Take 75 mg by mouth once daily.   Taking    esomeprazole (NEXIUM) 40 MG capsule TAKE 1 CAPSULE(40 MG) BY MOUTH BEFORE BREAKFAST 90 capsule 3 Taking    ferrous sulfate (FEOSOL) 325 mg (65 mg iron) Tab tablet ferrous sulfate 325 mg (65 mg iron) tablet  TK 1 T PO QD WITH MEALS 90 tablet 3 Taking    furosemide (LASIX) 20 MG tablet TAKE 1 TABLET(20 MG) BY MOUTH EVERY DAY 90 tablet 3 Taking    amLODIPine (NORVASC) 10 MG tablet Take 1 tablet (10 mg total) by mouth once daily. 30 tablet 11     gabapentin (NEURONTIN) 300 MG capsule gabapentin 300 mg capsule   TK ONE C PO  QHS (Patient not taking: Reported on 12/4/2024)       ibandronate (BONIVA) 150 mg tablet Take 1 tablet (150 mg total) by mouth every 30 days. 6 tablet 0     rivaroxaban (XARELTO) 2.5 mg Tab Take 1 tablet (2.5 mg total) by mouth once daily.  90 tablet 3       yes...

## 2025-02-19 NOTE — ANESTHESIA PREPROCEDURE EVALUATION
"                                                                                                             02/19/2025  Annie Perez is a 89 y.o., female with lesion at tail of pancreas.  Here today for EGD and U/S guided biopsy.  Other PMH noted including CAD, HTN, PVD, claudication.  She is calm and conversant today, laying flat in bed and breathing well.  She feels like she is in her usual state of health.    "HPI:  This is a 89 y.o. female here for evaluation of a large TOP lesion on CT scan; unclear if this is solid or cystic in nature.         ROS:  Constitutional: No fevers, chills, No weight loss  ENT: No allergies  CV: No chest pain  Pulm: No shortness of breath  GI: see HPI  Derm: No rash     Medical History:  has a past medical history of Arthritis, CAD (coronary artery disease), Claudication, Coronary stent restenosis, Hyperlipidemia, Hypertension, OA (osteoarthritis), Osteoporosis, Pancreatic mass, and PVD (peripheral vascular disease).     Surgical History:  has a past surgical history that includes Femoral artery stent (Bilateral, 03/17/2020); Finger fracture surgery (Right); GALL STONES REMOVED; Cataract extraction, bilateral; Atherectomy; Colonoscopy; revascularization (Left); and Thrombectomy.     ...     have explained the risks and benefits of endoscopy procedures to the patient including but not limited to bleeding, perforation, infection, and death."       Pre-op Assessment    I have reviewed the Patient Summary Reports.     I have reviewed the Nursing Notes. I have reviewed the NPO Status.   I have reviewed the Medications.     Review of Systems  Anesthesia Hx:  No problems with previous Anesthesia             Denies Family Hx of Anesthesia complications.    Denies Personal Hx of Anesthesia complications.                    Hematology/Oncology:       -- Anemia:                                  Cardiovascular:  Exercise tolerance: poor   Hypertension   CAD      Angina    PVD                  "               Pulmonary:  Pulmonary Normal      Denies Shortness of breath.                  Hepatic/GI:     GERD                Musculoskeletal:  Arthritis                   Physical Exam  General: Well nourished, Cooperative, Alert and Oriented    Airway:  Mallampati: II   Mouth Opening: Normal  TM Distance: Normal  Tongue: Normal  Neck ROM: Normal ROM    Dental:  Edentulous    Chest/Lungs:  Normal Respiratory Rate    Heart:  Rate: Normal  Rhythm: Regular Rhythm        Anesthesia Plan  Type of Anesthesia, risks & benefits discussed:    Anesthesia Type: Gen Supraglottic Airway  Intra-op Monitoring Plan: Standard ASA Monitors  Post Op Pain Control Plan: multimodal analgesia  Airway Plan: , Post-Induction  Informed Consent: Informed consent signed with the Patient and all parties understand the risks and agree with anesthesia plan.  All questions answered.   ASA Score: 3  Day of Surgery Review of History & Physical: H&P Update referred to the surgeon/provider.    Ready For Surgery From Anesthesia Perspective.     .

## 2025-02-19 NOTE — TRANSFER OF CARE
"Anesthesia Transfer of Care Note    Patient: Annie Perez    Procedure(s) Performed: Procedure(s) (LRB):  UPPER EUS W/ POSS FNA (N/A)  EGD (ESOPHAGOGASTRODUODENOSCOPY) (N/A)  EGD, WITH CLOSED BIOPSY    Patient location: [facility]    Anesthesia Type: MAC    Transport from OR: Transported from OR on room air with adequate spontaneous ventilation    Post pain: adequate analgesia    Post assessment: no apparent anesthetic complications    Post vital signs: stable    Level of consciousness: sedated    Nausea/Vomiting: no nausea/vomiting    Complications: none    Transfer of care protocol was followed      Last vitals: Visit Vitals  BP (!) 158/83   Pulse 79   Temp 37.1 °C (98.7 °F) (Oral)   Resp (!) 21   Ht 5' 4" (1.626 m)   Wt 61.7 kg (136 lb 0.4 oz)   SpO2 100%   Breastfeeding No   BMI 23.35 kg/m²     "

## 2025-02-19 NOTE — PROVATION PATIENT INSTRUCTIONS
Discharge Summary/Instructions after an Endoscopic Procedure  Patient Name: Annie Perez  Patient MRN: [id number]  Patient YOB: 1935 Wednesday, February 19, 2025  Ketan Bardales MD  Dear patient,  As a result of recent federal legislation (The Federal Cures Act), you may   receive lab or pathology results from your procedure in your [facility]   account before your physician is able to contact you. Your physician or   their representative will relay the results to you with their   recommendations at their soonest availability.  Thank you,  RESTRICTIONS:  During your procedure today, you received medications for sedation.  These   medications may affect your judgment, balance and coordination.  Therefore,   for 24 hours, you have the following restrictions:   - DO NOT drive a car, operate machinery, make legal/financial decisions,   sign important papers or drink alcohol.    ACTIVITY:  Today: no heavy lifting, straining or running due to procedural   sedation/anesthesia.  The following day: return to full activity including work.  DIET:  Eat and drink normally unless instructed otherwise.     TREATMENT FOR COMMON SIDE EFFECTS:  - Mild abdominal pain, nausea, belching, bloating or excessive gas:  rest,   eat lightly and use a heating pad.  - Sore Throat: treat with throat lozenges and/or gargle with warm salt   water.  - Because air was used during the procedure, expelling large amounts of air   from your rectum or belching is normal.  - If a bowel prep was taken, you may not have a bowel movement for 1-3 days.    This is normal.  SYMPTOMS TO WATCH FOR AND REPORT TO YOUR PHYSICIAN:  1. Abdominal pain or bloating, other than gas cramps.  2. Chest pain.  3. Back pain.  4. Signs of infection such as: chills or fever occurring within 24 hours   after the procedure.  5. Rectal bleeding, which would show as bright red, maroon, or black stools.   (A tablespoon of blood from the rectum is not serious, especially  if   hemorrhoids are present.)  6. Vomiting.  7. Weakness or dizziness.  GO DIRECTLY TO THE NEAREST EMERGENCY ROOM IF YOU HAVE ANY OF THE FOLLOWING:      Difficulty breathing              Chills and/or fever over 101 F   Persistent vomiting and/or vomiting blood   Severe abdominal pain   Severe chest pain   Black, tarry stools   Bleeding- more than one tablespoon   Any other symptom or condition that you feel may need urgent attention  Your doctor recommends these additional instructions:  If any biopsies were taken, your doctors clinic will contact you in 1 to 2   weeks with any results.  Recommendations:  - Discharge patient to home (with escort).   - Clear liquid diet today, then advance as tolerated to resume previous   diet.   - Continue present medications.   - Resume Plavix (clopidogrel) tomorrow and Pletal (cilostazol) tomorrow at   prior doses.  Refer to managing physician for further adjustment of   therapy.   - Cipro (ciprofloxacin) 500 mg PO BID for 3 days.   - Observe patient's clinical course.   - Await cytology results and await path results.   - Return to referring physician as previously scheduled.   - Perform magnetic resonance imaging (MRI) with IV contrast and MRCP images   in 6 months at [facility] imaging.   - Return to nurse practitioner in 6 months.  Impressions:  - Normal esophagus.   - Erythematous mucosa in the antrum.  Biopsied.   - Normal examined duodenum.   - There was no sign of significant pathology in the ampulla.   - There was no sign of significant pathology in the common bile duct.   - There was no evidence of significant pathology in the left lobe of the   liver.   - Endosonographic imaging of the pancreas showed sonographic changes   consistent with mild chronic pancreatitis.   - A cystic lesion was seen in the pancreatic tail.  Fine needle aspiration   for fluid performed.  For questions, problems or results please call your physician - Ketan Bardales MD at Work:  [phone].  [facility] ED at [phone]  IF A COMPLICATION OR EMERGENCY SITUATION ARISES AND YOU ARE UNABLE TO REACH   YOUR PHYSICIAN - GO DIRECTLY TO THE EMERGENCY ROOM.  Ketan Bardales MD  2/19/2025 11:50:37 AM  This report has been verified and signed electronically.  Dear patient,  As a result of recent federal legislation (The Federal Cures Act), you may   receive lab or pathology results from your procedure in your [facility]   account before your physician is able to contact you. Your physician or   their representative will relay the results to you with their   recommendations at their soonest availability.  Thank you,  PROVATION

## 2025-02-19 NOTE — DISCHARGE INSTRUCTIONS
Restrictions:  During your procedure today, you received medications for sedation. These medications may affect you judgment, balance and coordination. Therefore, for 24 hours, you have the following restrictions:    -DO NOT drive a car, operate machinery, make legal/financial decisions, sign important papers or drink alcohol.    Activity:  Today: no heavy lifting, straining or running due to procedural sedation/anesthesia.  The following day: return to full activity including work.    Diet:  Eat and drink normally unless instructed otherwise.    TREATMENT FOR COMMON SIDE EFFECTS:  -Mild abdominal pain, nausea, belching, bloating or excessive gas: rest, eat lightly and use a heating pad.  -Sore throat: treat with throat lozenges and/or gargle with warm salt water.  -Because air was used during the procedure, expelling large amounts of air from your rectum or belching is normal.  -If a bowel prep was taken, you may not have a bowel movement for 1-3 days.    SYMPTOMS TO WATCH FOR AND REPORT TO YOUR PHYSICIAN:  Abdominal pain or bloating, other than gas cramps.  Chest pain  Back pain  Signs of infection such as: chills or fever occurring within 24 hours after the procedure.  Rectal bleeding, which would show as bright red, maroon, or black stools. (A tablespoon of blood from the rectum is not serious, especially if hemorrhoids are present.)  Vomiting  Weakness or dizziness.    GO DIRECTLY TO THE NEAREST EMERGENCY ROOM IF YOU HAVE ANY OF THE FOLLOWING:    Difficulty breathing  Chills and/or fever over 101 F  Persistent vomiting and/or vomiting blood  Severe abdominal pain   Severe chest pain  Black, tarry stools  Bleeding- more than 1 tablespoon

## 2025-02-20 VITALS
TEMPERATURE: 99 F | OXYGEN SATURATION: 95 % | HEIGHT: 64 IN | HEART RATE: 67 BPM | DIASTOLIC BLOOD PRESSURE: 72 MMHG | WEIGHT: 136 LBS | SYSTOLIC BLOOD PRESSURE: 152 MMHG | BODY MASS INDEX: 23.22 KG/M2 | RESPIRATION RATE: 21 BRPM

## 2025-02-21 LAB — PSYCHE PATHOLOGY RESULT: NORMAL

## 2025-03-06 NOTE — ANESTHESIA POSTPROCEDURE EVALUATION
Anesthesia Post Evaluation    Patient: Annie Perez    Procedure(s) Performed: Procedure(s) (LRB):  UPPER EUS W/ POSS FNA (N/A)  EGD (ESOPHAGOGASTRODUODENOSCOPY) (N/A)  EGD, WITH CLOSED BIOPSY    Final Anesthesia Type: general      Patient location during evaluation: PACU  Patient participation: Yes- Able to Participate  Level of consciousness: awake and alert  Post-procedure vital signs: reviewed and stable  Pain management: adequate  Airway patency: patent      Anesthetic complications: no      Cardiovascular status: blood pressure returned to baseline  Respiratory status: unassisted  Hydration status: euvolemic  Follow-up not needed.              Vitals Value Taken Time   /72 02/19/25 10:25        Pulse 67 02/19/25 10:37        SpO2 95 % 02/19/25 10:37         No case tracking events are documented in the log.      Pain/Ollie Score: No data recorded

## 2025-03-24 ENCOUNTER — TELEPHONE (OUTPATIENT)
Dept: PRIMARY CARE CLINIC | Facility: CLINIC | Age: 89
End: 2025-03-24
Payer: MEDICARE

## 2025-03-24 NOTE — TELEPHONE ENCOUNTER
The patient's daughter states the patient had a cough, it went away with OTC corcidin, thera flu but is back again. She is coughing and it is dry. Please advise.     ----- Message from Charo sent at 3/24/2025 10:56 AM CDT -----  Contact: JED SALOMON [[id number]]  ..Type:  Patient Requesting CallWho Called:Eloise (daughter)Does the patient know what this is regarding?:pt's daughter is requesting medication to be sent over stating her mother(pt) has a bad colWould the patient rather a call back or a response via [facility]? [facility] Back Number:[phone] Information:

## 2025-03-27 DIAGNOSIS — R05.9 COUGH, UNSPECIFIED TYPE: Primary | ICD-10-CM

## 2025-03-27 RX ORDER — BENZONATATE 200 MG/1
200 CAPSULE ORAL 3 TIMES DAILY PRN
Qty: 30 CAPSULE | Refills: 0 | Status: SHIPPED | OUTPATIENT
Start: 2025-03-27 | End: 2025-04-06

## 2025-05-20 ENCOUNTER — OFFICE VISIT (OUTPATIENT)
Dept: PRIMARY CARE CLINIC | Facility: CLINIC | Age: 89
End: 2025-05-20
Payer: MEDICARE

## 2025-05-20 VITALS
SYSTOLIC BLOOD PRESSURE: 136 MMHG | HEIGHT: 64 IN | WEIGHT: 136 LBS | OXYGEN SATURATION: 99 % | BODY MASS INDEX: 23.22 KG/M2 | DIASTOLIC BLOOD PRESSURE: 74 MMHG | HEART RATE: 74 BPM

## 2025-05-20 DIAGNOSIS — K86.89 MASS OF PANCREAS: ICD-10-CM

## 2025-05-20 DIAGNOSIS — M81.0 AGE-RELATED OSTEOPOROSIS WITHOUT CURRENT PATHOLOGICAL FRACTURE: ICD-10-CM

## 2025-05-20 DIAGNOSIS — I10 ESSENTIAL HYPERTENSION: ICD-10-CM

## 2025-05-20 DIAGNOSIS — I25.119 ATHEROSCLEROSIS OF NATIVE CORONARY ARTERY OF NATIVE HEART WITH ANGINA PECTORIS: Primary | ICD-10-CM

## 2025-05-20 PROCEDURE — 1159F MED LIST DOCD IN RCRD: CPT | Mod: CPTII,,, | Performed by: INTERNAL MEDICINE

## 2025-05-20 PROCEDURE — G2211 COMPLEX E/M VISIT ADD ON: HCPCS | Mod: S$PBB,,, | Performed by: INTERNAL MEDICINE

## 2025-05-20 PROCEDURE — 1101F PT FALLS ASSESS-DOCD LE1/YR: CPT | Mod: CPTII,,, | Performed by: INTERNAL MEDICINE

## 2025-05-20 PROCEDURE — 99214 OFFICE O/P EST MOD 30 MIN: CPT | Mod: S$PBB,,, | Performed by: INTERNAL MEDICINE

## 2025-05-20 PROCEDURE — 3288F FALL RISK ASSESSMENT DOCD: CPT | Mod: CPTII,,, | Performed by: INTERNAL MEDICINE

## 2025-05-20 RX ORDER — IBANDRONATE SODIUM 150 MG/1
150 TABLET, FILM COATED ORAL
Qty: 6 TABLET | Refills: 0 | Status: SHIPPED | OUTPATIENT
Start: 2025-05-20 | End: 2025-05-25

## 2025-05-20 RX ORDER — CIPROFLOXACIN 500 MG/1
TABLET, FILM COATED ORAL
COMMUNITY
Start: 2025-02-19

## 2025-05-20 RX ORDER — OMEGA-3-ACID ETHYL ESTERS 1 G/1
2 CAPSULE, LIQUID FILLED ORAL 2 TIMES DAILY
COMMUNITY

## 2025-05-20 RX ORDER — PRAVASTATIN SODIUM 20 MG/1
1 TABLET ORAL NIGHTLY
COMMUNITY

## 2025-05-20 RX ORDER — NAPROXEN 250 MG/1
TABLET ORAL
COMMUNITY

## 2025-05-20 RX ORDER — CLONIDINE HYDROCHLORIDE 0.1 MG/1
0.1 TABLET ORAL DAILY PRN
Qty: 30 TABLET | Refills: 3 | Status: SHIPPED | OUTPATIENT
Start: 2025-05-20

## 2025-05-20 RX ORDER — CLOPIDOGREL BISULFATE 75 MG/1
75 TABLET ORAL DAILY
Qty: 90 TABLET | Refills: 3 | Status: SHIPPED | OUTPATIENT
Start: 2025-05-20

## 2025-05-20 RX ORDER — AMLODIPINE BESYLATE 5 MG/1
TABLET ORAL
COMMUNITY

## 2025-05-20 RX ORDER — VIT C/E/ZN/COPPR/LUTEIN/ZEAXAN 250MG-90MG
CAPSULE ORAL
COMMUNITY

## 2025-05-20 RX ORDER — CLONIDINE HYDROCHLORIDE 0.1 MG/1
1 TABLET ORAL DAILY PRN
COMMUNITY
End: 2025-05-20 | Stop reason: SDUPTHER

## 2025-05-20 RX ORDER — LISINOPRIL 40 MG/1
TABLET ORAL
COMMUNITY
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
## 2025-06-04 ENCOUNTER — RESULTS FOLLOW-UP (OUTPATIENT)
Dept: PRIMARY CARE CLINIC | Facility: CLINIC | Age: 89
End: 2025-06-04

## 2025-06-04 ENCOUNTER — TELEPHONE (OUTPATIENT)
Dept: PRIMARY CARE CLINIC | Facility: CLINIC | Age: 89
End: 2025-06-04

## 2025-06-04 ENCOUNTER — CLINICAL SUPPORT (OUTPATIENT)
Dept: OBSTETRICS AND GYNECOLOGY | Facility: CLINIC | Age: 89
End: 2025-06-04
Payer: MEDICARE

## 2025-06-04 ENCOUNTER — OFFICE VISIT (OUTPATIENT)
Dept: PRIMARY CARE CLINIC | Facility: CLINIC | Age: 89
End: 2025-06-04
Payer: MEDICARE

## 2025-06-04 VITALS
HEIGHT: 64 IN | SYSTOLIC BLOOD PRESSURE: 139 MMHG | BODY MASS INDEX: 22.99 KG/M2 | HEART RATE: 86 BPM | DIASTOLIC BLOOD PRESSURE: 76 MMHG | WEIGHT: 134.63 LBS | OXYGEN SATURATION: 99 %

## 2025-06-04 DIAGNOSIS — I73.9 PAD (PERIPHERAL ARTERY DISEASE): ICD-10-CM

## 2025-06-04 DIAGNOSIS — N18.32 STAGE 3B CHRONIC KIDNEY DISEASE: ICD-10-CM

## 2025-06-04 DIAGNOSIS — Z28.21 IMMUNIZATION REFUSED: ICD-10-CM

## 2025-06-04 DIAGNOSIS — I10 ESSENTIAL HYPERTENSION: Primary | ICD-10-CM

## 2025-06-04 DIAGNOSIS — R10.9 ABDOMINAL CRAMPING: ICD-10-CM

## 2025-06-04 DIAGNOSIS — M81.0 AGE-RELATED OSTEOPOROSIS WITHOUT CURRENT PATHOLOGICAL FRACTURE: Primary | ICD-10-CM

## 2025-06-04 DIAGNOSIS — I10 ESSENTIAL HYPERTENSION: ICD-10-CM

## 2025-06-04 LAB
ABS NRBC COUNT: 0 X 10 3/UL (ref 0–0.01)
ABSOLUTE BASOPHIL: 0.06 X 10 3/UL (ref 0–0.22)
ABSOLUTE EOSINOPHIL: 0.09 X 10 3/UL (ref 0.04–0.54)
ABSOLUTE IMMATURE GRAN: 0.02 X 10 3/UL (ref 0–0.04)
ABSOLUTE LYMPHOCYTE: 2.55 X 10 3/UL (ref 0.86–4.75)
ABSOLUTE MONOCYTE: 0.62 X 10 3/UL (ref 0.22–1.08)
ALBUMIN SERPL-MCNC: 4.6 G/DL (ref 3.5–5.2)
ALP ISOS SERPL LEV INH-CCNC: 11 U/L (ref 35–105)
ALT (SGPT): 17 U/L (ref 0–33)
AST SERPL-CCNC: 25 U/L (ref 0–32)
BASOPHILS NFR BLD: 0.9 % (ref 0.2–1.2)
BILIRUB CONJ+UNCONJ SERPL-MCNC: ABNORMAL MG/DL (ref 0.1–0.8)
BILIRUBIN DIRECT+TOT PNL SERPL-MCNC: <0.2 MG/DL (ref 0–0.3)
BILIRUBIN, TOTAL: 0.44 MG/DL (ref 0–1.2)
EOSINOPHIL NFR BLD: 1.4 % (ref 0.7–7)
GLOBULIN: 3.3 G/DL (ref 1.5–4.5)
HCT VFR BLD AUTO: 42.2 % (ref 37–47)
HGB BLD-MCNC: 13 G/DL (ref 12–16)
IMMATURE GRANULOCYTES: 0.3 % (ref 0–0.5)
LYMPHOCYTES NFR BLD: 38.8 % (ref 19.3–53.1)
MCH RBC QN AUTO: 22.6 PG (ref 27–32)
MCHC RBC AUTO-ENTMCNC: 30.8 G/DL (ref 32–36)
MCV RBC AUTO: 73.3 FL (ref 82–100)
MONOCYTES NFR BLD: 9.4 % (ref 4.7–12.5)
NEUTROPHILS # BLD AUTO: 3.24 X 10 3/UL (ref 2.15–7.56)
NEUTROPHILS NFR BLD: 49.2 % (ref 34–71.1)
NUCLEATED RED BLOOD CELLS: 0 /100 WBC (ref 0–0.2)
PLATELET # BLD AUTO: 359 X 10 3/UL (ref 135–400)
PROT SNV-MCNC: 7.9 G/DL (ref 6.4–8.3)
RBC # BLD AUTO: 5.76 X 10 6/UL (ref 4.2–5.4)
RDW-SD: 39.5 FL (ref 37–54)
WBC # BLD: 6.58 X 10 3/UL (ref 4.3–10.8)

## 2025-06-04 RX ORDER — DICYCLOMINE HYDROCHLORIDE 10 MG/1
10 CAPSULE ORAL
Qty: 120 CAPSULE | Refills: 0 | Status: SHIPPED | OUTPATIENT
Start: 2025-06-04 | End: 2025-07-04

## 2025-06-04 RX ORDER — NIFEDIPINE 30 MG/1
30 TABLET, EXTENDED RELEASE ORAL DAILY
Qty: 30 TABLET | Refills: 11 | Status: SHIPPED | OUTPATIENT
Start: 2025-06-04 | End: 2026-06-04
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
## 2025-06-05 ENCOUNTER — TELEPHONE (OUTPATIENT)
Dept: PRIMARY CARE CLINIC | Facility: CLINIC | Age: 89
End: 2025-06-05
Payer: MEDICARE

## 2025-06-09 ENCOUNTER — RESULTS FOLLOW-UP (OUTPATIENT)
Dept: PRIMARY CARE CLINIC | Facility: CLINIC | Age: 89
End: 2025-06-09

## 2025-06-09 DIAGNOSIS — E87.6 HYPOKALEMIA: Primary | ICD-10-CM

## 2025-06-09 RX ORDER — POTASSIUM CHLORIDE 750 MG/1
10 TABLET, EXTENDED RELEASE ORAL DAILY
Qty: 30 TABLET | Refills: 0 | Status: SHIPPED | OUTPATIENT
Start: 2025-06-09

## 2025-06-11 PROBLEM — N18.32 STAGE 3B CHRONIC KIDNEY DISEASE: Status: ACTIVE | Noted: 2025-06-11

## 2025-06-16 ENCOUNTER — TELEPHONE (OUTPATIENT)
Dept: PRIMARY CARE CLINIC | Facility: CLINIC | Age: 89
End: 2025-06-16
Payer: MEDICARE

## 2025-06-20 ENCOUNTER — TELEPHONE (OUTPATIENT)
Dept: PRIMARY CARE CLINIC | Facility: CLINIC | Age: 89
End: 2025-06-20
Payer: MEDICARE

## 2025-06-20 NOTE — TELEPHONE ENCOUNTER
PA HAS BEEN COMPLETED FOR REQUEST OF PROLIA AND SENT TO HER INS --NOW WAITING ON A REPLY FROM HER INS WHICH CAN TAKE 72 HRS

## 2025-06-24 ENCOUNTER — TELEPHONE (OUTPATIENT)
Dept: PRIMARY CARE CLINIC | Facility: CLINIC | Age: 89
End: 2025-06-24
Payer: MEDICARE

## 2025-06-24 NOTE — TELEPHONE ENCOUNTER
Pt informed that her has been completed for request of Prolia and her ins has approved --scheduling will be calling to set up date/time of apt--pt was asked if she does not hear about apt in a few days to please call office and let us know

## 2025-07-01 ENCOUNTER — TELEPHONE (OUTPATIENT)
Dept: PRIMARY CARE CLINIC | Facility: CLINIC | Age: 89
End: 2025-07-01
Payer: MEDICARE

## 2025-07-01 DIAGNOSIS — I73.9 PAD (PERIPHERAL ARTERY DISEASE): Primary | ICD-10-CM

## 2025-07-01 RX ORDER — CILOSTAZOL 50 MG/1
50 TABLET ORAL 2 TIMES DAILY
Qty: 180 TABLET | Refills: 2 | Status: SHIPPED | OUTPATIENT
Start: 2025-07-01

## 2025-07-01 RX ORDER — AMLODIPINE BESYLATE 10 MG/1
TABLET ORAL
COMMUNITY
Start: 2025-06-17

## 2025-07-01 NOTE — TELEPHONE ENCOUNTER
Copied from CRM #[id number]. Topic: Medications - Medication Question  >> Jul 1, 2025 12:07 PM Marilin wrote:  ..Type:  Patient Requesting Call    Who Called: daughterEbony  What is the call regarding?: the pt does not take cilostazoL (PLETAL) 50 MG Tab anymore.   Would the patient rather a call back or a response via [facility]?  call  Best Call Back Number: [phone] (work)  Additional Information:

## 2025-07-01 NOTE — TELEPHONE ENCOUNTER
I see the patient had an ordered put in last month for her prolia. Is she ready to get scheduled? Is the PA approved?     Copied from CRM #[id number]. Topic: Appointments - Appointment Access  >> Jul 1, 2025 11:27 AM Della wrote:  ..Type:  Patient Requesting Call    Who Called:Eloise(Daughter)  Would the patient rather a call back or a response via [facility]? Call  Best Call Back Number:.[phone]  Additional Information:Caller wanted to schedule a bone injection appointment

## 2025-07-02 NOTE — TELEPHONE ENCOUNTER
Pt's daughter has been informed that the corrected provider has bee update per her ins and resent to scheduling--scheduling will call to set up apt/date time--ask if she dos not hear in a few days to let office know

## 2025-07-12 DIAGNOSIS — E87.6 HYPOKALEMIA: ICD-10-CM

## 2025-07-14 RX ORDER — POTASSIUM CHLORIDE 750 MG/1
TABLET, EXTENDED RELEASE ORAL
Qty: 30 TABLET | Refills: 0 | Status: SHIPPED | OUTPATIENT
Start: 2025-07-14

## 2025-08-08 DIAGNOSIS — K21.9 GASTROESOPHAGEAL REFLUX DISEASE WITHOUT ESOPHAGITIS: Primary | ICD-10-CM

## 2025-08-08 RX ORDER — PANTOPRAZOLE SODIUM 20 MG/1
20 TABLET, DELAYED RELEASE ORAL DAILY
Qty: 30 TABLET | Refills: 3 | Status: SHIPPED | OUTPATIENT
Start: 2025-08-08 | End: 2025-12-06

## 2025-08-09 DIAGNOSIS — E87.6 HYPOKALEMIA: ICD-10-CM

## 2025-08-11 RX ORDER — POTASSIUM CHLORIDE 750 MG/1
TABLET, EXTENDED RELEASE ORAL
Qty: 30 TABLET | Refills: 0 | Status: SHIPPED | OUTPATIENT
Start: 2025-08-11

## 2025-08-20 RX ORDER — CLONIDINE HYDROCHLORIDE 0.1 MG/1
TABLET ORAL
Qty: 30 TABLET | Refills: 3 | Status: SHIPPED | OUTPATIENT
Start: 2025-08-20

## 2025-08-22 ENCOUNTER — TELEPHONE (OUTPATIENT)
Dept: PRIMARY CARE CLINIC | Facility: CLINIC | Age: 89
End: 2025-08-22
Payer: MEDICARE

## 2025-08-27 DIAGNOSIS — I73.9 PAD (PERIPHERAL ARTERY DISEASE): ICD-10-CM

## 2025-08-27 RX ORDER — ATORVASTATIN CALCIUM 40 MG/1
40 TABLET, FILM COATED ORAL NIGHTLY
Qty: 90 TABLET | Refills: 3 | Status: SHIPPED | OUTPATIENT
Start: 2025-08-27

## (undated) DEVICE — NDL BIOPSY SHARKCORE 22G

## (undated) DEVICE — SOL IRRI STRL WATER 1000ML

## (undated) DEVICE — BAG LABGUARD BIOHAZARD 6X9IN

## (undated) DEVICE — KIT SURGICAL COLON .25 1.1OZ

## (undated) DEVICE — NDL BIOPSY SHARKCORE 25G

## (undated) DEVICE — CONTAINER SPECIMEN SCREW 4OZ

## (undated) DEVICE — KIT CANIST SUCTION 1200CC

## (undated) DEVICE — TIP SUCTION YANKAUER

## (undated) DEVICE — COLLECTION SPECIMEN NEPTUNE

## (undated) DEVICE — TUBING O2 FEMALE CONN 13FT